# Patient Record
Sex: FEMALE | Race: WHITE | Employment: UNEMPLOYED | URBAN - METROPOLITAN AREA
[De-identification: names, ages, dates, MRNs, and addresses within clinical notes are randomized per-mention and may not be internally consistent; named-entity substitution may affect disease eponyms.]

---

## 2019-02-27 PROBLEM — M05.79 RHEUMATOID ARTHRITIS INVOLVING MULTIPLE SITES WITH POSITIVE RHEUMATOID FACTOR (HCC): Status: ACTIVE | Noted: 2019-02-27

## 2019-02-27 PROBLEM — J84.9 INTERSTITIAL LUNG DISEASE (HCC): Status: ACTIVE | Noted: 2019-02-27

## 2019-02-27 PROBLEM — O09.511 AMA (ADVANCED MATERNAL AGE) PRIMIGRAVIDA 35+, FIRST TRIMESTER: Status: ACTIVE | Noted: 2019-02-27

## 2019-03-07 PROBLEM — Z67.91 RH NEGATIVE STATE IN ANTEPARTUM PERIOD: Status: ACTIVE | Noted: 2019-03-07

## 2019-03-07 PROBLEM — O26.899 RH NEGATIVE STATE IN ANTEPARTUM PERIOD: Status: ACTIVE | Noted: 2019-03-07

## 2019-03-20 PROBLEM — J96.11 CHRONIC RESPIRATORY FAILURE WITH HYPOXIA (HCC): Status: ACTIVE | Noted: 2019-03-20

## 2019-05-06 PROBLEM — O09.512 PRIMIGRAVIDA OF ADVANCED MATERNAL AGE IN SECOND TRIMESTER: Status: ACTIVE | Noted: 2019-02-27

## 2019-05-09 PROBLEM — J96.11 CHRONIC RESPIRATORY FAILURE WITH HYPOXIA (HCC): Status: RESOLVED | Noted: 2019-03-20 | Resolved: 2019-05-09

## 2019-05-13 ENCOUNTER — HOSPITAL ENCOUNTER (OUTPATIENT)
Dept: NON INVASIVE DIAGNOSTICS | Age: 39
Discharge: HOME | End: 2019-05-13
Attending: INTERNAL MEDICINE
Payer: COMMERCIAL

## 2019-05-13 PROBLEM — O09.92 HRP (HIGH RISK PREGNANCY), SECOND TRIMESTER: Status: ACTIVE | Noted: 2019-05-13

## 2019-05-13 PROBLEM — O44.02 PLACENTA PREVIA ANTEPARTUM IN SECOND TRIMESTER: Status: ACTIVE | Noted: 2019-05-13

## 2019-05-13 PROCEDURE — 93306 TTE W/DOPPLER COMPLETE: CPT

## 2019-05-17 NOTE — PROGRESS NOTES
Spoke with the patient in regards to their Echo results, explained to the patient per Dr. Ciro West that the echo did not show any signs of pulmonary hypertension which is good news and that he would like for her to keep her follow up appointment with Dr. Apollo Artis. Patient understood and did not have any further questions or concerns at this time. // Georgia REYES

## 2019-05-27 ENCOUNTER — HOSPITAL ENCOUNTER (OUTPATIENT)
Age: 39
Discharge: HOME | End: 2019-05-27
Attending: OBSTETRICS & GYNECOLOGY | Admitting: OBSTETRICS & GYNECOLOGY
Payer: COMMERCIAL

## 2019-05-27 VITALS
RESPIRATION RATE: 16 BRPM | DIASTOLIC BLOOD PRESSURE: 72 MMHG | SYSTOLIC BLOOD PRESSURE: 124 MMHG | HEART RATE: 86 BPM | WEIGHT: 235 LBS | BODY MASS INDEX: 32.9 KG/M2 | TEMPERATURE: 98 F | HEIGHT: 71 IN

## 2019-05-27 PROBLEM — R10.9 ABDOMINAL PAIN DURING PREGNANCY IN SECOND TRIMESTER: Status: ACTIVE | Noted: 2019-05-27

## 2019-05-27 PROBLEM — O26.892 ABDOMINAL PAIN DURING PREGNANCY IN SECOND TRIMESTER: Status: ACTIVE | Noted: 2019-05-27

## 2019-05-27 LAB
GLUCOSE, GLUUPC: NEGATIVE
KETONES UR-MCNC: NEGATIVE MG/DL
PROT UR QL: NEGATIVE

## 2019-05-27 PROCEDURE — 81002 URINALYSIS NONAUTO W/O SCOPE: CPT | Performed by: OBSTETRICS & GYNECOLOGY

## 2019-05-27 PROCEDURE — 99282 EMERGENCY DEPT VISIT SF MDM: CPT

## 2019-05-27 RX ORDER — GUAIFENESIN 100 MG/5ML
162 LIQUID (ML) ORAL DAILY
COMMUNITY
End: 2019-09-16

## 2019-05-27 RX ORDER — ZINC GLUCONATE 10 MG
250 LOZENGE ORAL
COMMUNITY
End: 2019-10-14

## 2019-05-27 RX ORDER — ACETAMINOPHEN/DIPHENHYDRAMINE 500MG-25MG
TABLET ORAL
COMMUNITY
End: 2019-10-02

## 2019-05-27 NOTE — DISCHARGE INSTRUCTIONS
Patient Education   Patient Education   Patient Education        Low-Fat Diet for Gallbladder Disease: Care Instructions  Your Care Instructions    When you eat, the gallbladder releases bile, which helps you digest the fat in food. If you have an inflamed gallbladder, this may cause pain. A low-fat diet may give your gallbladder a rest so you can start to heal. Your doctor and dietitian can help you make an eating plan that does not irritate your digestive system. Always talk with your doctor or dietitian before you make changes in your diet. Follow-up care is a key part of your treatment and safety. Be sure to make and go to all appointments, and call your doctor if you are having problems. It's also a good idea to know your test results and keep a list of the medicines you take. How can you care for yourself at home? · Eat many small meals and snacks each day instead of three large meals. · Choose lean meats. ? Eat no more than 5 to 6½ ounces of meat a day. ? Cut off all fat you can see. ? Eat chicken and turkey without the skin. ? Many types of fish, such as salmon, lake trout, tuna, and herring, provide healthy omega-3 fat. But, avoid fish canned in oil, such as sardines in olive oil. ? Bake, broil, or grill meats, poultry, or fish instead of frying them in butter or fat. · Drink or eat nonfat or low-fat milk, yogurt, cheese, or other milk products each day. ? Read the labels on cheeses, and choose those with less than 5 grams of fat an ounce. ? Try fat-free sour cream, cream cheese, or yogurt. ? Avoid cream soups and cream sauces on pasta. ? Eat low-fat ice cream, frozen yogurt, or sorbet. Avoid regular ice cream.  · Eat whole-grain cereals, breads, crackers, rice, or pasta. Avoid high-fat foods such as croissants, scones, biscuits, waffles, doughnuts, muffins, granola, and high-fat breads.   · Flavor your foods with herbs and spices (such as basil, tarragon, or mint), fat-free sauces, or lemon juice instead of butter. You can also use butter substitutes, fat-free mayonnaise, or fat-free dressing. · Try applesauce, prune puree, or mashed bananas to replace some or all of the fat when you bake. · Limit fats and oils, such as butter, margarine, mayonnaise, and salad dressing, to no more than 1 tablespoon a meal.  · Avoid high-fat foods, such as:  ? Chocolate, whole milk, ice cream, and processed cheese. ? Fried or buttered foods. ? Sausage, salami, and hung. ? Cinnamon rolls, cakes, pies, cookies, and other pastries. ? Prepared snack foods, such as potato chips, nut and granola bars, and mixed nuts. ? Coconut and avocado. · Learn how to read food labels for serving sizes and ingredients. Fast-food and convenience-food meals often have lots of fat. Where can you learn more? Go to http://magaly-surendra.info/. Enter I833 in the search box to learn more about \"Low-Fat Diet for Gallbladder Disease: Care Instructions. \"  Current as of: 2018  Content Version: 11.9  © 6735-7602 [employer]. Care instructions adapted under license by [employer] (which disclaims liability or warranty for this information). If you have questions about a medical condition or this instruction, always ask your healthcare professional. [place] [address] any warranty or liability for your use of this information.  Labor: Care Instructions  Your Care Instructions     labor is the start of labor between 21 and 36 weeks of pregnancy. A full-term pregnancy lasts 37 to 42 weeks. In labor, the uterus contracts to open the cervix. This is the first stage of childbirth.  labor can be caused by a problem with the baby, the mother, or both. Often the cause is not known. In some cases, doctors use medicines to try to delay labor until 29 or more weeks of pregnancy. By this time, a baby has grown enough so that problems are not likely.  In some cases--such as with a serious infection--it is healthier for the baby to be born early. Your treatment will depend on how far along you are in your pregnancy and on your health and your baby's health. Follow-up care is a key part of your treatment and safety. Be sure to make and go to all appointments, and call your doctor if you are having problems. It's also a good idea to know your test results and keep a list of the medicines you take. How can you care for yourself at home? · If your doctor prescribed medicines, take them exactly as directed. Call your doctor if you think you are having a problem with your medicine. · Rest until your doctor advises you about activity. He or she will tell you if you should stay in bed most of the time. You may need to arrange for  if you have young children. · Do not have sexual intercourse unless your doctor says it is safe. · Use pads, not tampons, if you have vaginal bleeding. · Make sure to drink plenty of fluids. Dehydration can lead to contractions. If you have kidney, heart, or liver disease and have to limit fluids, talk with your doctor before you increase the amount of fluids you drink. · Do not smoke or allow others to smoke around you. If you need help quitting, talk to your doctor about stop-smoking programs and medicines. These can increase your chances of quitting for good. When should you call for help? Call 911 anytime you think you may need emergency care. For example, call if:    · You passed out (lost consciousness).     · You have severe vaginal bleeding.     · You have severe pain in your belly or pelvis.     · You have had fluid gushing or leaking from your vagina and you know or think the umbilical cord is bulging into your vagina. If this happens, immediately get down on your knees so your rear end (buttocks) is higher than your head.  This will decrease the pressure on the cord until help arrives.   [facility] your doctor now or seek immediate medical care if:    · You have signs of preeclampsia, such as:  ? Sudden swelling of your face, hands, or feet. ? New vision problems (such as dimness or blurring). ? A severe headache.     · You have any vaginal bleeding.     · You have belly pain or cramping.     · You have a fever.     · You have had regular contractions (with or without pain) for an hour. This means that you have 6 or more within 1 hour after you change your position and drink fluids.     · You have a sudden release of fluid from the vagina.     · You have low back pain or pelvic pressure that does not go away.     · You notice that your baby has stopped moving or is moving much less than normal.    Watch closely for changes in your health, and be sure to contact your doctor if you have any problems. Where can you learn more? Go to http://magaly-surendra.info/. Enter Q400 in the search box to learn more about \" Labor: Care Instructions. \"  Current as of: 2018  Content Version: 11.9  © 3399-3614 [employer]. Care instructions adapted under license by [employer] (which disclaims liability or warranty for this information). If you have questions about a medical condition or this instruction, always ask your healthcare professional. [address] any warranty or liability for your use of this information. Pregnancy Precautions: Care Instructions  Your Care Instructions    There is no sure way to prevent labor before your due date ( labor) or to prevent most other pregnancy problems. But there are things you can do to increase your chances of a healthy pregnancy. Go to your appointments, follow your doctor's advice, and take good care of yourself. Eat well, and exercise (if your doctor agrees). And make sure to drink plenty of water. Follow-up care is a key part of your treatment and safety.  Be sure to make and go to all appointments, and call your doctor if you are having problems. It's also a good idea to know your test results and keep a list of the medicines you take. How can you care for yourself at home? · Make sure you go to your prenatal appointments. At each visit, your doctor will check your blood pressure. Your doctor will also check to see if you have protein in your urine. High blood pressure and protein in urine are signs of preeclampsia. This condition can be dangerous for you and your baby. · Drink plenty of fluids, enough so that your urine is light yellow or clear like water. Dehydration can cause contractions. If you have kidney, heart, or liver disease and have to limit fluids, talk with your doctor before you increase the amount of fluids you drink. · Tell your doctor right away if you notice any symptoms of an infection, such as:  ? Burning when you urinate. ? A foul-smelling discharge from your vagina. ? Vaginal itching. ? Unexplained fever. ? Unusual pain or soreness in your uterus or lower belly. · Eat a balanced diet. Include plenty of foods that are high in calcium and iron. ? Foods high in calcium include milk, cheese, yogurt, almonds, and broccoli. ? Foods high in iron include red meat, shellfish, poultry, eggs, beans, raisins, whole-grain bread, and leafy green vegetables. · Do not smoke. If you need help quitting, talk to your doctor about stop-smoking programs and medicines. These can increase your chances of quitting for good. · Do not drink alcohol or use illegal drugs. · Follow your doctor's directions about activity. Your doctor will let you know how much, if any, exercise you can do. · Ask your doctor if you can have sex. If you are at risk for early labor, your doctor may ask you to not have sex. · Take care to prevent falls. During pregnancy, your joints are loose, and your balance is off. Sports such as bicycling, skiing, or in-line skating can increase your risk of falling.  And don't ride horses or motorcycles, dive, water ski, scuba dive, or parachute jump while you are pregnant. · Avoid getting very hot. Do not use saunas or hot tubs. Avoid staying out in the sun in hot weather for long periods. Take acetaminophen (Tylenol) to lower a high fever. · Do not take any over-the-counter or herbal medicines or supplements without talking to your doctor or pharmacist first.  When should you call for help? Call 911 anytime you think you may need emergency care. For example, call if:    · You passed out (lost consciousness).     · You have severe vaginal bleeding.     · You have severe pain in your belly or pelvis.     · You have had fluid gushing or leaking from your vagina and you know or think the umbilical cord is bulging into your vagina. If this happens, immediately get down on your knees so your rear end (buttocks) is higher than your head. This will decrease the pressure on the cord until help arrives.   [facility] your doctor now or seek immediate medical care if:    · You have signs of preeclampsia, such as:  ? Sudden swelling of your face, hands, or feet. ? New vision problems (such as dimness or blurring). ? A severe headache.     · You have any vaginal bleeding.     · You have belly pain or cramping.     · You have a fever.     · You have had regular contractions (with or without pain) for an hour. This means that you have 8 or more within 1 hour or 4 or more in 20 minutes after you change your position and drink fluids.     · You have a sudden release of fluid from your vagina.     · You have low back pain or pelvic pressure that does not go away.     · You notice that your baby has stopped moving or is moving much less than normal.    Watch closely for changes in your health, and be sure to contact your doctor if you have any problems. Where can you learn more? Go to http://magaly-surendra.info/.   Enter [id number] in the search box to learn more about \"Pregnancy Precautions: Care Instructions. \"  Current as of: September 5, 2018  Content Version: 11.9  © 0894-7242 [employer], [facility]. Care instructions adapted under license by [employer] (which disclaims liability or warranty for this information). If you have questions about a medical condition or this instruction, always ask your healthcare professional. [place] [address] any warranty or liability for your use of this information.

## 2019-05-27 NOTE — ED PROVIDER NOTES
Chief Complaint:      41 y.o. female at 21w1d  weeks gestation who is seen for moderate abdominal pain. Pain present off and on x 2 wks, much worse after eating [employer] last night, greasy food. Denies nausea or vomiting. Denies fevers chills, vaginal bleeding, cramping or LOF. Good fetal movement. Pregnancy complicated by RA w interstitial lung involvement. HISTORY:    Social History     Substance and Sexual Activity   Sexual Activity Yes    Birth control/protection: None     Patient's last menstrual period was 12/30/2018 (approximate).     Social History     Socioeconomic History    Marital status:      Spouse name: Not on file    Number of children: Not on file    Years of education: Not on file    Highest education level: Not on file   Occupational History    Not on file   Social Needs    Financial resource strain: Not on file    Food insecurity:     Worry: Not on file     Inability: Not on file    Transportation needs:     Medical: Not on file     Non-medical: Not on file   Tobacco Use    Smoking status: Never Smoker    Smokeless tobacco: Never Used   Substance and Sexual Activity    Alcohol use: No     Frequency: Never    Drug use: No    Sexual activity: Yes     Birth control/protection: None   Lifestyle    Physical activity:     Days per week: Not on file     Minutes per session: Not on file    Stress: Not on file   Relationships    Social connections:     Talks on phone: Not on file     Gets together: Not on file     Attends Voodoo service: Not on file     Active member of club or organization: Not on file     Attends meetings of clubs or organizations: Not on file     Relationship status: Not on file    Intimate partner violence:     Fear of current or ex partner: Not on file     Emotionally abused: Not on file     Physically abused: Not on file     Forced sexual activity: Not on file   Other Topics Concern   [employer] [address] Service Not Asked    Blood Transfusions Not Asked    Caffeine Concern No    Occupational Exposure Not Asked    Hobby Hazards Not Asked    Sleep Concern Not Asked    Stress Concern Not Asked    Weight Concern Not Asked    Special Diet Not Asked    Back Care Not Asked    Exercise No    Bike Helmet Not Asked    Seat Belt Yes    Self-Exams Yes   Social History Narrative    Abuse: Feels safe at home, no history of physical abuse, no history of sexual abuse       Past Surgical History:   Procedure Laterality Date    HX TONSILLECTOMY         Past Medical History:   Diagnosis Date    Arthritis, rheumatoid ([address]     Bowel obstruction ([address]     no surgery done    Chronic respiratory failure with hypoxia ([address] 3/20/2019    Disease of blood and blood forming organ     Insomnia     Lung interstitial disease ([address]     Pericardial cyst     Rhesus isoimmunization affecting pregnancy     Rh negative    Spondylosis          ROS:  A 12 point review of symptoms negative except for chief complaint as described above. PHYSICAL EXAM:  Blood pressure 124/72, pulse 86, temperature 98 °F (36.7 °C), resp. rate 16, height 5' 11\" (1.803 m), weight 106.6 kg (235 lb), last menstrual period 12/30/2018. Constitutional: The patient appears well, alert, oriented x 3. Cardiovascular: Heart RRR, no murmurs. Respiratory: Lungs clear, no respiratory distress  GI: Abdomen soft, moderately tender RUQ more laterally. Reproduces pt's complaint. No fundal tenderness  Musculoskeletal: no cva tenderness  Upper ext: no edema, reflexes +2  Lower ext: no edema, neg moriah's, reflexes +2  Skin: no rashes or lesions  Psychiatric:Mood/ Affect: appropriate  Genitourinary: SVE: deferred, no contractions  FHT: 145-150  TOCO: no contractions    UA negative    I personally reviewed pt's medical record including relevant labs and ultrasounds    Assessment/Plan: RUQ pain, suspect cholecystitis. Offered eval in main ED w ultrasound, declines at this time.   Discussed gall bladder diet.   Questions answered.

## 2019-05-27 NOTE — PROGRESS NOTES
Given discharge instructions-  labor precautions, gallbladder information, pregnancy precautions. Pt verbalized understanding. Has an appt on 6/3/19. Will call MD's office in the morning. Left unit ambulating.

## 2019-05-27 NOTE — PROGRESS NOTES
Dr Maxine Womack at . Assessment completed. Offered to be sent downstairs for a gallbladder ultrasound. Pt initially said she would go downstairs but then said she will  Go home and call the office tomorrow. Dr Maxine Womack encouraged a low fat bland diet. Pt verbalized understanding.

## 2019-05-27 NOTE — PROGRESS NOTES
States she has been having RUQ pain x2 weeks, worsening yesterday. Constant discomfort- dull stabbing. Pt states she did eat zaxby's yesterday. +'s and toco applied.

## 2019-06-03 PROBLEM — R10.9 ABDOMINAL PAIN DURING PREGNANCY IN SECOND TRIMESTER: Status: RESOLVED | Noted: 2019-05-27 | Resolved: 2019-06-03

## 2019-06-03 PROBLEM — O26.892 ABDOMINAL PAIN DURING PREGNANCY IN SECOND TRIMESTER: Status: RESOLVED | Noted: 2019-05-27 | Resolved: 2019-06-03

## 2019-06-06 ENCOUNTER — TELEPHONE (OUTPATIENT)
Dept: LABOR AND DELIVERY | Age: 39
End: 2019-06-06

## 2019-06-25 ENCOUNTER — HOSPITAL ENCOUNTER (OUTPATIENT)
Dept: SURGERY | Age: 39
Discharge: HOME | End: 2019-06-25
Attending: OBSTETRICS & GYNECOLOGY

## 2019-06-25 NOTE — PERIOP NOTES
Pt here to see anesthesia prior to delivery d/t medical history. Received orders from  (anesthesiologist) to obtain medical records from [facility] re: pt's dx of spondylosis and treatment. Pt reports received treatment at [facility] in [place], [place] between the years for 2005- present which included pulmonology and rheumatology departments. Anesthesia requesting x-rays, MRI, PFTs, bronchoscopy reports and most recent MD office notes. [facility] in [facility]. Obtained fax # for HMI/ medical records department. Faxed request along with signed Authorization to disclose health information form to [facility]. Requested information be faxed to [facility]- 4th floor L&D Navigator, Karla Bess.     Also emailed Navigator with above information re: anesthesia request.

## 2019-06-27 PROBLEM — O44.42 LOW-LYING PLACENTA IN SECOND TRIMESTER: Status: ACTIVE | Noted: 2019-05-13

## 2019-06-27 PROBLEM — M06.9 MATERNAL RHEUMATOID ARTHRITIS COMPLICATING PREGNANCY (HCC): Status: ACTIVE | Noted: 2019-02-27

## 2019-06-27 PROBLEM — O99.891 MATERNAL RHEUMATOID ARTHRITIS COMPLICATING PREGNANCY (HCC): Status: ACTIVE | Noted: 2019-02-27

## 2019-07-02 PROBLEM — R05.9 COUGH: Status: ACTIVE | Noted: 2019-07-02

## 2019-07-16 PROBLEM — O09.93 HIGH-RISK PREGNANCY IN THIRD TRIMESTER: Status: ACTIVE | Noted: 2019-05-13

## 2019-07-16 PROBLEM — O09.513 PRIMIGRAVIDA OF ADVANCED MATERNAL AGE IN THIRD TRIMESTER: Status: ACTIVE | Noted: 2019-02-27

## 2019-07-16 PROBLEM — R05.9 COUGH: Status: RESOLVED | Noted: 2019-07-02 | Resolved: 2019-07-16

## 2019-07-16 PROBLEM — O44.43 LOW-LYING PLACENTA IN THIRD TRIMESTER: Status: ACTIVE | Noted: 2019-05-13

## 2019-07-17 PROBLEM — O99.013 ANEMIA DURING PREGNANCY IN THIRD TRIMESTER: Status: ACTIVE | Noted: 2019-07-17

## 2019-07-25 PROBLEM — O36.63X0 MATERNAL CARE FOR EXCESSIVE FETAL GROWTH IN THIRD TRIMESTER: Status: ACTIVE | Noted: 2019-07-25

## 2019-09-29 ENCOUNTER — ANESTHESIA EVENT (OUTPATIENT)
Dept: LABOR AND DELIVERY | Age: 39
End: 2019-09-29
Payer: COMMERCIAL

## 2019-09-30 ENCOUNTER — HOSPITAL ENCOUNTER (INPATIENT)
Age: 39
LOS: 2 days | Discharge: HOME | End: 2019-10-02
Attending: OBSTETRICS & GYNECOLOGY | Admitting: OBSTETRICS & GYNECOLOGY
Payer: COMMERCIAL

## 2019-09-30 ENCOUNTER — ANESTHESIA (OUTPATIENT)
Dept: LABOR AND DELIVERY | Age: 39
End: 2019-09-30
Payer: COMMERCIAL

## 2019-09-30 PROBLEM — Z98.891 HISTORY OF PRIMARY CESAREAN SECTION: Status: ACTIVE | Noted: 2019-09-30

## 2019-09-30 LAB
ABO + RH BLD: NORMAL
BLOOD BANK CMNT PATIENT-IMP: NORMAL
BLOOD GROUP ANTIBODIES SERPL: NORMAL
ERYTHROCYTE [DISTWIDTH] IN BLOOD BY AUTOMATED COUNT: 14.5 % (ref 11.9–14.6)
FETAL SCREEN,FMHS: NORMAL
HCT VFR BLD AUTO: 37.4 % (ref 35.8–46.3)
HGB BLD-MCNC: 12.7 G/DL (ref 11.7–15.4)
MCH RBC QN AUTO: 29.7 PG (ref 26.1–32.9)
MCHC RBC AUTO-ENTMCNC: 34 G/DL (ref 31.4–35)
MCV RBC AUTO: 87.4 FL (ref 79.6–97.8)
NRBC # BLD: 0 K/UL (ref 0–0.2)
PLATELET # BLD AUTO: 198 K/UL (ref 150–450)
PMV BLD AUTO: 10.8 FL (ref 9.4–12.3)
RBC # BLD AUTO: 4.28 M/UL (ref 4.05–5.2)
SPECIMEN EXP DATE BLD: NORMAL
WBC # BLD AUTO: 10.7 K/UL (ref 4.3–11.1)

## 2019-09-30 PROCEDURE — 77030034696 HC CATH URETH FOL 2W BARD -A

## 2019-09-30 PROCEDURE — 85461 HEMOGLOBIN FETAL: CPT

## 2019-09-30 PROCEDURE — 77030032490 HC SLV COMPR SCD KNE COVD -B: Performed by: OBSTETRICS & GYNECOLOGY

## 2019-09-30 PROCEDURE — 77030003665 HC NDL SPN BBMI -A: Performed by: ANESTHESIOLOGY

## 2019-09-30 PROCEDURE — 77030002966 HC SUT PDS J&J -A: Performed by: OBSTETRICS & GYNECOLOGY

## 2019-09-30 PROCEDURE — 65270000029 HC RM PRIVATE

## 2019-09-30 PROCEDURE — 85027 COMPLETE CBC AUTOMATED: CPT

## 2019-09-30 PROCEDURE — 76010000391 HC C SECN FIRST 1 HR: Performed by: OBSTETRICS & GYNECOLOGY

## 2019-09-30 PROCEDURE — 4A1HXCZ MONITORING OF PRODUCTS OF CONCEPTION, CARDIAC RATE, EXTERNAL APPROACH: ICD-10-PCS | Performed by: OBSTETRICS & GYNECOLOGY

## 2019-09-30 PROCEDURE — 86900 BLOOD TYPING SEROLOGIC ABO: CPT

## 2019-09-30 PROCEDURE — 77030007880 HC KT SPN EPDRL BBMI -B: Performed by: ANESTHESIOLOGY

## 2019-09-30 PROCEDURE — 77030018846 HC SOL IRR STRL H20 ICUM -A: Performed by: OBSTETRICS & GYNECOLOGY

## 2019-09-30 PROCEDURE — 74011250636 HC RX REV CODE- 250/636: Performed by: ANESTHESIOLOGY

## 2019-09-30 PROCEDURE — 75410000003 HC RECOV DEL/VAG/CSECN EA 0.5 HR: Performed by: OBSTETRICS & GYNECOLOGY

## 2019-09-30 PROCEDURE — 77030002933 HC SUT MCRYL J&J -A: Performed by: OBSTETRICS & GYNECOLOGY

## 2019-09-30 PROCEDURE — 77030020255 HC SOL INJ LR 1000ML BG

## 2019-09-30 PROCEDURE — 74011250636 HC RX REV CODE- 250/636

## 2019-09-30 PROCEDURE — 76060000078 HC EPIDURAL ANESTHESIA: Performed by: OBSTETRICS & GYNECOLOGY

## 2019-09-30 PROCEDURE — 74011250636 HC RX REV CODE- 250/636: Performed by: OBSTETRICS & GYNECOLOGY

## 2019-09-30 PROCEDURE — 74011000250 HC RX REV CODE- 250

## 2019-09-30 PROCEDURE — 77030018836 HC SOL IRR NACL ICUM -A: Performed by: OBSTETRICS & GYNECOLOGY

## 2019-09-30 PROCEDURE — 77030040361 HC SLV COMPR DVT MDII -B

## 2019-09-30 PROCEDURE — 74011250637 HC RX REV CODE- 250/637: Performed by: ANESTHESIOLOGY

## 2019-09-30 PROCEDURE — 59025 FETAL NON-STRESS TEST: CPT

## 2019-09-30 PROCEDURE — 59510 CESAREAN DELIVERY: CPT | Performed by: OBSTETRICS & GYNECOLOGY

## 2019-09-30 PROCEDURE — 77030031139 HC SUT VCRL2 J&J -A: Performed by: OBSTETRICS & GYNECOLOGY

## 2019-09-30 PROCEDURE — 77030011943: Performed by: OBSTETRICS & GYNECOLOGY

## 2019-09-30 RX ORDER — SODIUM CHLORIDE, SODIUM LACTATE, POTASSIUM CHLORIDE, CALCIUM CHLORIDE 600; 310; 30; 20 MG/100ML; MG/100ML; MG/100ML; MG/100ML
125 INJECTION, SOLUTION INTRAVENOUS CONTINUOUS
Status: DISCONTINUED | OUTPATIENT
Start: 2019-09-30 | End: 2019-10-01

## 2019-09-30 RX ORDER — MORPHINE SULFATE 0.5 MG/ML
INJECTION, SOLUTION EPIDURAL; INTRATHECAL; INTRAVENOUS
Status: COMPLETED | OUTPATIENT
Start: 2019-09-30 | End: 2019-09-30

## 2019-09-30 RX ORDER — TRISODIUM CITRATE DIHYDRATE AND CITRIC ACID MONOHYDRATE 500; 334 MG/5ML; MG/5ML
30 SOLUTION ORAL ONCE
Status: COMPLETED | OUTPATIENT
Start: 2019-09-30 | End: 2019-09-30

## 2019-09-30 RX ORDER — METOCLOPRAMIDE 10 MG/1
10 TABLET ORAL ONCE
Status: COMPLETED | OUTPATIENT
Start: 2019-09-30 | End: 2019-09-30

## 2019-09-30 RX ORDER — SODIUM CHLORIDE, SODIUM LACTATE, POTASSIUM CHLORIDE, CALCIUM CHLORIDE 600; 310; 30; 20 MG/100ML; MG/100ML; MG/100ML; MG/100ML
INJECTION, SOLUTION INTRAVENOUS
Status: DISCONTINUED | OUTPATIENT
Start: 2019-09-30 | End: 2019-09-30 | Stop reason: HOSPADM

## 2019-09-30 RX ORDER — ONDANSETRON 2 MG/ML
INJECTION INTRAMUSCULAR; INTRAVENOUS AS NEEDED
Status: DISCONTINUED | OUTPATIENT
Start: 2019-09-30 | End: 2019-09-30 | Stop reason: HOSPADM

## 2019-09-30 RX ORDER — BUPIVACAINE HYDROCHLORIDE 7.5 MG/ML
INJECTION, SOLUTION INTRASPINAL
Status: COMPLETED | OUTPATIENT
Start: 2019-09-30 | End: 2019-09-30

## 2019-09-30 RX ORDER — KETOROLAC TROMETHAMINE 30 MG/ML
INJECTION, SOLUTION INTRAMUSCULAR; INTRAVENOUS AS NEEDED
Status: DISCONTINUED | OUTPATIENT
Start: 2019-09-30 | End: 2019-09-30 | Stop reason: HOSPADM

## 2019-09-30 RX ORDER — HYDROMORPHONE HYDROCHLORIDE 1 MG/ML
1 INJECTION, SOLUTION INTRAMUSCULAR; INTRAVENOUS; SUBCUTANEOUS
Status: DISCONTINUED | OUTPATIENT
Start: 2019-09-30 | End: 2019-10-01

## 2019-09-30 RX ORDER — FAMOTIDINE 20 MG/1
20 TABLET, FILM COATED ORAL ONCE
Status: COMPLETED | OUTPATIENT
Start: 2019-09-30 | End: 2019-09-30

## 2019-09-30 RX ORDER — KETOROLAC TROMETHAMINE 30 MG/ML
30 INJECTION, SOLUTION INTRAMUSCULAR; INTRAVENOUS
Status: DISCONTINUED | OUTPATIENT
Start: 2019-09-30 | End: 2019-10-01

## 2019-09-30 RX ORDER — OXYTOCIN/RINGER'S LACTATE 30/500 ML
PLASTIC BAG, INJECTION (ML) INTRAVENOUS
Status: DISCONTINUED | OUTPATIENT
Start: 2019-09-30 | End: 2019-09-30 | Stop reason: HOSPADM

## 2019-09-30 RX ORDER — CEFAZOLIN SODIUM/WATER 2 G/20 ML
2 SYRINGE (ML) INTRAVENOUS ONCE
Status: COMPLETED | OUTPATIENT
Start: 2019-09-30 | End: 2019-09-30

## 2019-09-30 RX ORDER — NALBUPHINE HYDROCHLORIDE 10 MG/ML
5 INJECTION, SOLUTION INTRAMUSCULAR; INTRAVENOUS; SUBCUTANEOUS
Status: DISCONTINUED | OUTPATIENT
Start: 2019-09-30 | End: 2019-10-01

## 2019-09-30 RX ORDER — SODIUM CHLORIDE 0.9 % (FLUSH) 0.9 %
5-40 SYRINGE (ML) INJECTION EVERY 8 HOURS
Status: DISCONTINUED | OUTPATIENT
Start: 2019-09-30 | End: 2019-09-30 | Stop reason: HOSPADM

## 2019-09-30 RX ORDER — SODIUM CHLORIDE 0.9 % (FLUSH) 0.9 %
5-40 SYRINGE (ML) INJECTION AS NEEDED
Status: DISCONTINUED | OUTPATIENT
Start: 2019-09-30 | End: 2019-09-30 | Stop reason: HOSPADM

## 2019-09-30 RX ORDER — DEXAMETHASONE SODIUM PHOSPHATE 100 MG/10ML
INJECTION INTRAMUSCULAR; INTRAVENOUS AS NEEDED
Status: DISCONTINUED | OUTPATIENT
Start: 2019-09-30 | End: 2019-09-30 | Stop reason: HOSPADM

## 2019-09-30 RX ORDER — EPHEDRINE SULFATE 50 MG/ML
INJECTION, SOLUTION INTRAVENOUS AS NEEDED
Status: DISCONTINUED | OUTPATIENT
Start: 2019-09-30 | End: 2019-09-30 | Stop reason: HOSPADM

## 2019-09-30 RX ORDER — OXYCODONE HYDROCHLORIDE 5 MG/1
5 TABLET ORAL
Status: DISCONTINUED | OUTPATIENT
Start: 2019-09-30 | End: 2019-10-01

## 2019-09-30 RX ORDER — ACETAMINOPHEN 500 MG
1000 TABLET ORAL EVERY 8 HOURS
Status: COMPLETED | OUTPATIENT
Start: 2019-09-30 | End: 2019-10-01

## 2019-09-30 RX ADMIN — OXYCODONE HYDROCHLORIDE 5 MG: 5 TABLET ORAL at 20:35

## 2019-09-30 RX ADMIN — BUPIVACAINE HYDROCHLORIDE 12 MG: 7.5 INJECTION, SOLUTION INTRASPINAL at 09:15

## 2019-09-30 RX ADMIN — KETOROLAC TROMETHAMINE 30 MG: 30 INJECTION, SOLUTION INTRAMUSCULAR at 16:01

## 2019-09-30 RX ADMIN — KETOROLAC TROMETHAMINE 30 MG: 30 INJECTION, SOLUTION INTRAMUSCULAR at 22:29

## 2019-09-30 RX ADMIN — FAMOTIDINE 20 MG: 20 TABLET, FILM COATED ORAL at 08:01

## 2019-09-30 RX ADMIN — ONDANSETRON 4 MG: 2 INJECTION INTRAMUSCULAR; INTRAVENOUS at 09:32

## 2019-09-30 RX ADMIN — EPHEDRINE SULFATE 10 MG: 50 INJECTION, SOLUTION INTRAVENOUS at 09:43

## 2019-09-30 RX ADMIN — SODIUM CITRATE AND CITRIC ACID MONOHYDRATE 30 ML: 500; 334 SOLUTION ORAL at 08:01

## 2019-09-30 RX ADMIN — METOCLOPRAMIDE 10 MG: 10 TABLET ORAL at 08:01

## 2019-09-30 RX ADMIN — SODIUM CHLORIDE, SODIUM LACTATE, POTASSIUM CHLORIDE, AND CALCIUM CHLORIDE 125 ML/HR: 600; 310; 30; 20 INJECTION, SOLUTION INTRAVENOUS at 11:45

## 2019-09-30 RX ADMIN — KETOROLAC TROMETHAMINE 30 MG: 30 INJECTION, SOLUTION INTRAMUSCULAR; INTRAVENOUS at 09:57

## 2019-09-30 RX ADMIN — MORPHINE SULFATE 0.15 MG: 0.5 INJECTION, SOLUTION EPIDURAL; INTRATHECAL; INTRAVENOUS at 09:15

## 2019-09-30 RX ADMIN — SODIUM CHLORIDE, SODIUM LACTATE, POTASSIUM CHLORIDE, AND CALCIUM CHLORIDE 1000 ML: 600; 310; 30; 20 INJECTION, SOLUTION INTRAVENOUS at 07:27

## 2019-09-30 RX ADMIN — DEXAMETHASONE SODIUM PHOSPHATE 5 MG: 100 INJECTION INTRAMUSCULAR; INTRAVENOUS at 09:57

## 2019-09-30 RX ADMIN — ACETAMINOPHEN 1000 MG: 500 TABLET, FILM COATED ORAL at 19:49

## 2019-09-30 RX ADMIN — SODIUM CHLORIDE, SODIUM LACTATE, POTASSIUM CHLORIDE, CALCIUM CHLORIDE: 600; 310; 30; 20 INJECTION, SOLUTION INTRAVENOUS at 09:08

## 2019-09-30 RX ADMIN — ACETAMINOPHEN 1000 MG: 500 TABLET, FILM COATED ORAL at 11:57

## 2019-09-30 RX ADMIN — EPHEDRINE SULFATE 10 MG: 50 INJECTION, SOLUTION INTRAVENOUS at 09:23

## 2019-09-30 RX ADMIN — Medication 2 G: at 08:30

## 2019-09-30 RX ADMIN — Medication 300 ML/HR: at 09:32

## 2019-09-30 NOTE — ANESTHESIA PROCEDURE NOTES
Spinal Block    Performed by: Pito Sheets MD  Authorized by: Pito Sheets MD     Pre-procedure:   Indications: primary anesthetic  Preanesthetic Checklist: patient identified, risks and benefits discussed, anesthesia consent, patient being monitored and timeout performed    Timeout Time: 09:11          Spinal Block:   Patient Position:  Seated  Prep Region:  Lumbar  Prep: chlorhexidine and patient draped      Location:  L3-4  Technique:  Single shot    Local Dose (mL):  3    Needle:   Needle Type:  Pencan  Needle Gauge:  25 G  Attempts:  1      Events: CSF confirmed, no blood with aspiration and no paresthesia        Assessment:  Insertion:  Uncomplicated  Patient tolerance:  Patient tolerated the procedure well with no immediate complications

## 2019-09-30 NOTE — PROGRESS NOTES
Admission assessment complete as noted. Patient oriented to room and unit. Plan of care reviewed and patient verbalizes understanding. Questions encouraged and answered. Patent encouraged to call for needs or concerns. Safety Teaching reviewed:   1. Hand hygiene prior to handling the infant. 2. Bracelets with matching numbers are placed on mother and infant  3. An infant security tag  [facility]) is placed on the infant's ankle and monitored  4. All OB nurses wear pink Employee badges - do not give your baby to anyone without proper identification. 5. Never leave the baby alone in the room. 6. The infant should be placed on their back to sleep. on a firm mattress. No toys should be placed in the crib. (safe sleep video offered to view)  7. Never shake the baby (video offered to view)  8. Infant fall prevention - do not sleep with the baby, and place the baby in the crib while ambulating. 5. Mother and Baby Care booklet given to Mother.

## 2019-09-30 NOTE — OP NOTES
Harriet Webber Lakeisha Lundberg  1980    Preop Dx:   1. IUP @ 39 1/7 weeks gestation   2. Interstitial lung disease    Postop Dx: Same    Procedure: Primary LTCS    Surgeon: Franci Diez      Anesthesia: spinl    EBL: 600 mL  IVF: 800 mL  UOP: 586 mL    Complications: None    Findings:  Viable Male infant. Vtx position. APGARS 9,9.  Weight:  9 lbs, 14 oz. Normal appearing uterus, tubes and ovaries. Procedure:  Pt was taken to the operating room where spinal anesthesia was found to be adequate. She was then prepped and draped in the usual sterile fashion and placed in the supine position with a leftward tilt. A Pfannenstiel skin incision was made with the scalpel and carried down to the underlying layer of fascia with the bovie. The fascia was incised in the midline and the incision extended laterally with the garza scissors. Superior of the fascial incision was grasped with Kocher clamps and  from the rectus muscles sharply and bluntly. In a similar fashion, the inferior aspect of the fascial incision was grasped with the Kocher clamps and  from the rectus muscles sharply and bluntly. The rectus muscles were  in the midline bluntly and peritoneum entered bluntly. The peritoneal incision was extended manually. Bladder blade was inserted and lower uterine incision made with the scalpel. Incision extended manually laterally. Infants head delivered atraumatically. Nose and mouth suctioned. Cord clamped and cut. Infant handed off to the waiting NICU staff. The uterus was exteriorized and cleared of all clots and debris. Hysterotomy was closed with 0-vicryl in a running, locking fashion. A second layer of 0-vicryl was placed in a interrupted figure of 8 fashion to imbricate the incision. The pelvis and gutters were cleared of all clots and debris, the uterus returned to the abdomen and hemostasis was assured throughout. Intercede was placed over the hysterotomy site.   Fascial incision repaired with 0-PDS in a running fashion. Subcutaneous tissue was cleared of all clots and debris and hemostasis assured. Subcutaneous tissue reapproximated with 3-0 vicryl rapide in a running fashion. Skin closed with 4-0 monocryl in a subcuticular fashion. Pt tolerated procedure well. Sponge, lap and needle counts correct x 3. Disposition: Pt to RR stable and infant to  nursery stable.     Pathology: none      Linda Persaud MD  9:59 AM  19

## 2019-09-30 NOTE — ANESTHESIA POSTPROCEDURE EVALUATION
Procedure(s):   SECTION. spinal    Anesthesia Post Evaluation        Patient location during evaluation: PACU  Patient participation: complete - patient participated  Level of consciousness: awake  Pain management: satisfactory to patient  Airway patency: patent  Anesthetic complications: no  Cardiovascular status: hemodynamically stable  Respiratory status: spontaneous ventilation  Hydration status: euvolemic  Post anesthesia nausea and vomiting:  none    O2 sat 97% on RA. Respirations unlabored. No vitals data found for the desired time range.

## 2019-09-30 NOTE — PROGRESS NOTES
Bedside Report of care using Wow received from [facility], RN. Jean-Paul graham.

## 2019-09-30 NOTE — LACTATION NOTE

## 2019-09-30 NOTE — LACTATION NOTE
In to see mom and infant for the first time. infant was latched on the left breast and sucking rhythmically in the cross cradle hold. Infant nursed for several more minutes and came off the breast content. I took infant and burped him and then placed him on mom's right breast in the cross cradle hold. Infant latched and nursed for 10 more minutes and fell asleep. Reviewed the expectations of the first 24 hours. Lactation consultant will follow up tomorrow.

## 2019-09-30 NOTE — PROGRESS NOTES
Patient in MIU room 447, report received from [facility]  Infant skin to skin   O2 sat 96-98% on room air.   Patient state she feels good with her breathing

## 2019-09-30 NOTE — ANESTHESIA PREPROCEDURE EVALUATION
Relevant Problems   No relevant active problems       Anesthetic History               Review of Systems / Medical History  Patient summary reviewed, nursing notes reviewed and pertinent labs reviewed    Pulmonary          Shortness of breath      Comments: ILD requiring 2L NC O2 at night and with exertion   Neuro/Psych             Comments: scoliosis Cardiovascular                  Exercise tolerance: <4 METS  Comments: Recent normal echo. GI/Hepatic/Renal                Endo/Other        Obesity and arthritis (RA)     Other Findings            Physical Exam    Airway  Mallampati: I  TM Distance: 4 - 6 cm  Neck ROM: normal range of motion   Mouth opening: Normal     Cardiovascular  Regular rate and rhythm,  S1 and S2 normal,  no murmur, click, rub, or gallop             Dental  No notable dental hx       Pulmonary  Breath sounds clear to auscultation               Abdominal         Other Findings            Anesthetic Plan    ASA: 3  Anesthesia type: spinal            Anesthetic plan and risks discussed with: Patient and Spouse      Risk of GA and post op vent addressed.

## 2019-09-30 NOTE — PROGRESS NOTES
SBAR OUT Report: Mother    Verbal report given to LONI Garrett RN on this patient, who is now being transferred to MIU for routine progression of care. The patient is wearing a green \"Anesthesia-Duramorph\" band. Report consisted of patient's Situation, Background, Assessment and Recommendations (SBAR).  ID bands were compared with the identification form, and verified with the patient and receiving nurse. Information from the SBAR, Intake/Output, MAR and Recent Results and the [place] Report was reviewed with the receiving nurse; opportunity for questions and clarification provided.

## 2019-09-30 NOTE — L&D DELIVERY NOTE
Delivery Summary    Patient: Max Owusu MRN: [id number]  SSN: [id number]    YOB: 1980  Age: 46 y.o. Sex: female        Information for the patient's :  Nic Santoyo [[id number]]       Labor Events:    Labor: No    Steroids: None   Cervical Ripening Date/Time:       Cervical Ripening Type: None   Antibiotics During Labor: No   Rupture Identifier:      Rupture Date/Time: 2019 9:29 AM   Rupture Type: AROM   Amniotic Fluid Volume: Moderate    Amniotic Fluid Description: Clear    Amniotic Fluid Odor: None    Induction: None       Induction Date/Time:        Indications for Induction:      Augmentation: None   Augmentation Date/Time:      Indications for Augmentation:     Labor complications: None       Additional complications:        Delivery Events:  Indications For Episiotomy:     Episiotomy: None   Perineal Laceration(s): None   Repaired:     Periurethral Laceration Location:      Repaired:     Labial Laceration Location:     Repaired:     Sulcal Laceration Location:     Repaired:     Vaginal Laceration Location:     Repaired:     Cervical Laceration Location:     Repaired:     Repair Suture: None   Number of Repair Packets:     Estimated Blood Loss (ml):  ml     Delivery Date: 2019    Delivery Time: 9:30 AM   Delivery Type: , Low Transverse     Details    Trial of Labor: No   Primary/Repeat: Primary   Priority: Routine   Indications:          Sex:  Male     Gestational Age: 36w3d  Delivery Clinician:  Charles Long  Living Status: Living   Delivery Location: OR            APGARS  One minute Five minutes Ten minutes   Skin color: 1   1        Heart rate: 2   2        Grimace: 2   2        Muscle tone: 2   2        Breathin   2        Totals: 9   9          Presentation: Vertex    Position:        Resuscitation Method:  Suctioning-bulb; Tactile Stimulation     Meconium Stained: None      Cord Information: 3 Vessels  Complications: Nuchal Cord Without Compressions  Cord around: head  Delayed cord clamping? No  Cord clamped date/time:   Disposition of Cord Blood: Lab    Blood Gases Sent?: No    Placenta:  Date/Time: 2019  9:31 AM  Removal: Expressed      Appearance: Normal;Intact     [place] Measurements:  Birth Weight: 9 lb 13.9 oz (4.475 kg)      Birth Length: 1' 10.05\" (0.56 m)      Head Circumference: 1' 2.96\" (0.38 m)      Chest Circumference: 1' 1.58\" (0.345 m)     Abdominal Girth: Other Providers:   Jamie Blair OB;AYLEEN PENA;BRITNEY VERDE;SHI MOCK;JAMEY BULLOCK JR;YANNICK ARCHER;ROBBY GARCIA;REGULO TANG;ROSE MARIE JOHNSON, Obstetrician;Primary Nurse;Primary [place] Nurse;Neonatologist;Anesthesiologist;Crna;Scrub Tech;Scrub Tech;Respiratory Therapist;Student Nurse             Group B Strep: No results found for: GRBSEXT, GRBSEXT  Information for the patient's :  Merced Andrea [[id number]]   No results found for: ABORH, PCTABR, PCTDIG, BILI, ABORHEXT, ABORH    No results for input(s): PCO2CB, PO2CB, HCO3I, SO2I, IBD, PTEMPI, SPECTI, PHICB, ISITE, IDEV, IALLEN in the last 72 hours.

## 2019-09-30 NOTE — PROGRESS NOTES
09/30/19 0719   Peripheral IV 09/30/19 Anterior;Right Forearm   Placement Date/Time: 09/30/19 6157   Number of Attempts: 1  Inserted By: Cindi Oneal  Present on Admission/Arrival: No  Size: 18 G  Orientation: Anterior;Right  Location: Forearm   Site Assessment Clean, dry, & intact   Phlebitis Assessment 0   Infiltration Assessment 0   Dressing Status Clean, dry, & intact   Dressing Type Tape;Transparent   Hub Color/Line Status Green; Infusing   Action Taken Blood drawn

## 2019-09-30 NOTE — ROUTINE PROCESS
SBAR IN Report: Mother    Verbal report received from Daya Bahena RN on this patient, who is now being transferred from L&D for routine progression of care. The patient is wearing a green \"Anesthesia-Duramorph\" band. Report consisted of patient's Situation, Background, Assessment and Recommendations (SBAR). [place] ID bands were compared with the identification form, and verified with the patient and transferring nurse. Information from the SBAR, Kardex, OR Summary, Procedure Summary, Intake/Output, MAR, [place] and Recent Results and the [place] Report was reviewed with the transferring nurse; opportunity for questions and clarification provided.

## 2019-09-30 NOTE — H&P
[facility] H&P      Assessment/Plan    Problem List  Date Reviewed: 2019          Codes Class    History of primary  section ICD-10-CM: Z98.891  ICD-9-CM: V45.89         Maternal care for excessive fetal growth in third trimester ICD-10-CM: O36.63X0  ICD-9-CM: 656.63     Overview Addendum 2019 11:12 AM by Allie Mar NP     19 at [facility]:  EFW 87%, AC 94%, DARRELL 16.7 cm  2019: EFW 98%, AC 92%, all anatomy > 90%, DARRELL nl, vertex, BPP              Anemia during pregnancy in third trimester ICD-10-CM: O99.013  ICD-9-CM: 648.23     Overview Addendum 2019 11:01 AM by Allie Mar NP     Additional Fe    9/3/2019: 11.4             Low-lying placenta in third trimester ICD-10-CM: O44.43  ICD-9-CM: 641.03     Overview Addendum 2019 11:21 AM by Capri Whitehead MD     2019 at [facility]:  Previa tail comes to os. Should resolve on its own  2019 at [facility]: Placenta 1.7 cm from os  2019 at [facility]:  Previously seen low lying placenta (unable to visualize today). 19: not visualized on US today  2019 at [facility]:  Not visualized today. High-risk pregnancy in third trimester ICD-10-CM: O09.93  ICD-9-CM: V23.9     Overview Addendum 2019  3:55 PM by Maico Hernandez RN       Pt with hx interstitial lung disease, pericardial cyst and rheumatoid arthritis-not currently on meds  2019 at [facility]: Normal anatomy and echo; AC 92%, CL 5.7cm, posterior placenta previa noted, lower thin edge just covers internal os. Significant interstitial lung disease, Sats in 70's with walking, asymptomatic at rest, is on 2L O2 when sleeping. Has not had a sleep study done. Will await studies and info from Dr. Heather Silverman and team manage the pregnancy. At high risk of Preeclampsia and worsening pulmonary function. 2019 [facility] Note- Will contact Pulmonologist regarding below test results from yesterday.  -Cardiac Echo normal yesterday.   -PFTs-Moderately severe restrictive lung disease. 2019 at [facility]:  Appropriate fetal growth, accelerated. Reassuring fetal status; AC 87%, Overall 61%, DARRELL 17 cm Dopplers WNL, BPP 8/8, CL 5.1 cm. Pulmonary testing revealed restrictive disease, no Pulmonary HTN. No symptoms at rest, mild SOB with exertion, supplemental Oxygen for sleep, will be getting Pulse Ox monitor, Sleep study to be don as needed. Currently being managed closely by Pulmonologist.  Discussed plan of expectant management, will see back sooner prn.    2019 at [facility]: Accelerated fetal growth. AC 97%, overall 76%, DARRELL 16 cm, UA Dopplers WNL. Unable to repeat echo due to fetal position. 2019 at [facility]:  Accelerated fetal growth, reassuring fetal status. Previously noted low lying placenta (unable to visualize today). AC 94%, overall 87%, DARRELL 16.7 cm, UA Dopplers WNL, BPP 8/8  2019 at [facility]:  Improved fetal growth, reassuring fetal status. AC 70%, overall 74%, DARRELL 16.9 cm, UA Dopplers WNL, BPP 8/8     · No follow up scheduled at [facility]; will see back prn. · Continue twice weekly fetal testing at 32 weeks in primary OB office. · Preeclamptic warnings given. Rh negative state in antepartum period ICD-10-CM: O26.899, Z67.91  ICD-9-CM: 646.83     Overview Addendum 2019 11:53 AM by King Cachorro MD     Rhogam at 28 weeks (given 19) and PP, if indicated             Primigravida of advanced maternal age in third trimester ICD-10-CM: O09.513  ICD-9-CM: 659.53     Overview Addendum 2019 11:29 AM by King Cachorro MD     [facility] by LMP confirmed by 8 5 week US  PLAN PRIMARY C/S DUE TO LUNG CONCERNS - PLAN agreed upon by anesthesia and MFM  Scheduled for 19 @ 0900 (sched with Gloria Perry 19 @ 36)    PLAN:  NIPT (neg x 3, male), baby ASA x 2, MFM referral for fetal echo/anatomy,  testing around 35 weeks with delivery around 39 weeks    2019 at [facility]: Normal anatomy and echo.              Interstitial lung disease [facility]) ICD-10-CM: J84.9  ICD-9-CM: 039     Overview Addendum 2019  3:44 PM by Maico Hernandez RN     Mgmt per Pulm (Dr. Susan Viera)    2019 at [facility]: Unknown severity of Pulmonary function or possible pulmonry hypertension. Assymptomatic at rest, very symptomatic withi walking. She has an echo and pulmonology appt today. She is concerned about having a vag delivery and pushing with her limited lung function. 2019: Anesthesia Consult scheduled for 2019 @ 1500  2019 [facility]: stable PFTs; O2 while sleeping or if activity >3-5 minutes. Stable echo. Both pulmonology and anesthesia recommend epidural for pain control over spinal.   2019 at [facility]:  Symptoms stable. Uses O2 throughout the night and during the day if increased activity. S/p Anesthesia consult on .  2019 at [facility]:  Symptoms stable; continues to use O2 throughout night/increased activity. · Discussion of best route for delivery; pt leaning towards scheduled cs due to severity of symptoms with exertion  · Defer to pulmonology; next appt in 2019. Maternal rheumatoid arthritis complicating pregnancy [facility]) ICD-10-CM: O99.89, M06.9  ICD-9-CM: 648.70, 714.0     Overview Addendum 2019  3:43 PM by Maico Hernandez RN     2019 at [facility]: Stable - no meds currently  2019 at [facility]:  Symptoms stable (joint stiffness to back, neck, fingers, ankles). Has been referred to Dr. Lorenzo Andrea, but hasn't made an appt yet. 2019 at [facility]:  Stable symptoms. · Defer management to rheumatology. Jemma Younganda Akbar Lundberg 46 y.o.  39w1d  presented to L&D for C/S. Pt has no complaints today. Pt denies vaginal bleeding/discharge. No LOF.  +FM. D/W pt at length risks/benefits of procedure including but not limited to death, bleeding, infection and damage to other internal organs. She exhibited full understanding and wishes to proceed.       OB History  Para Term  AB Living   1 0 0 0 0 0   SAB TAB Ectopic Molar Multiple Live Births   0 0 0 0 0 0      # Outcome Date GA Lbr Dima/2nd Weight Sex Delivery Anes PTL Lv   1 Current                Past Medical History:   Diagnosis Date    Anemia during pregnancy in third trimester 2019    Arthritis, rheumatoid (HCC)     Bowel obstruction (HCC)     no surgery done    Chronic respiratory failure with hypoxia ([facility] [address] 3/20/2019    Disease of blood and blood forming organ     Insomnia     Lung interstitial disease ([facility] [address]     Pericardial cyst     Rhesus isoimmunization affecting pregnancy     Rh negative    Spondylosis        Past Surgical History:   Procedure Laterality Date    HX TONSILLECTOMY         Family History   Problem Relation Age of Onset    Breast Cancer Mother 61    Uterine Cancer Mother 36    Hypertension Mother     Elevated Lipids Mother     Cancer Father         skin    Arthritis Father     Colon Cancer Neg Hx     Ovarian Cancer Neg Hx        Social History     Socioeconomic History    Marital status:      Spouse name: Lima Chan Number of children: Not on file    Years of education: Not on file    Highest education level:  Bachelor's degree (e.g., BA, AB, BS)   Occupational History    Not on file   Social Needs    Financial resource strain: Not on file    Food insecurity:     Worry: Not on file     Inability: Not on file    Transportation needs:     Medical: Not on file     Non-medical: Not on file   Tobacco Use    Smoking status: Never Smoker    Smokeless tobacco: Never Used   Substance and Sexual Activity    Alcohol use: No     Frequency: Never    Drug use: No    Sexual activity: Yes     Birth control/protection: None   Lifestyle    Physical activity:     Days per week: Not on file     Minutes per session: Not on file    Stress: Not on file   Relationships    Social connections:     Talks on phone: Not on file     Gets together: Not on file     Attends Congregational service: Not on file     Active member of club or organization: Not on file     Attends meetings of clubs or organizations: Not on file     Relationship status: Not on file    Intimate partner violence:     Fear of current or ex partner: Not on file     Emotionally abused: Not on file     Physically abused: Not on file     Forced sexual activity: Not on file   Other Topics Concern     Service Not Asked    Blood Transfusions Not Asked    Caffeine Concern No    Occupational Exposure Not Asked    Hobby Hazards Not Asked    Sleep Concern Not Asked    Stress Concern Not Asked    Weight Concern Not Asked    Special Diet Not Asked    Back Care Not Asked    Exercise No    Bike Helmet Not Asked    Seat Belt Yes    Self-Exams Yes   Social History Narrative    Abuse: Feels safe at home, no history of physical abuse, no history of sexual abuse       Allergies   Allergen Reactions    Iodine Anaphylaxis    Betadine [Povidone-Iodine] Hives         Review of Systems:    Constitutional: No fevers or chills     Prenatal: + fetal movement, no VB/DC, no LOF     CV: No chest pain or palpatations    Resp: No SOB or cough    GI: No nausea/vomiting/diarrhea/constipation    Neuro: No HA, no seizure like activity    Skin: No rashes or lesions     Breast: No breast pain    : No dysuria or hematuria      Prenatal Record Review    The prenatal record has been reviewed. Prenatal Labs:   No results found for: RUBELLAEXT, GRBSEXT, HBSAGEXT, HIVEXT, RPREXT, GONNOEXT, CHLAMEXT    Objective    Visit Vitals  /87 (BP 1 Location: Left arm, BP Patient Position: At rest)   Pulse (!) 101   Temp 97.9 °F (36.6 °C)   Resp 16   Wt 259 lb (117.5 kg)   LMP 12/30/2018 (Approximate)   SpO2 98%   Breastfeeding?  Yes   BMI 35.13 kg/m²       Physical Exam    Gen: alert and cooperative, NAD    HEENT: NCAT    CV: RRR    RESP: CTA bilat    ABD: Gravid, soft, NT    EXT: trace edema bilat    NEURO: No focal deficits    SKIN: No noted rashes or lesions       Ian Chandler MD  8:14 AM  09/30/19

## 2019-09-30 NOTE — PROGRESS NOTES
Pt admitted to Room 422 for scheduled . Admission assessment completed. Discussed plan of care with patient. Discussed pt's choice of infant feeding method. Feeding options explored, including the importance of exclusive breastfeeding. Pt informed of our breastfeeding support system from from nursing staff and lactation staff during inpatient stay and following discharge. Questions and concerns addressed at this time. Pt confirms breastfeeding is her decision at this time.

## 2019-10-01 LAB
ERYTHROCYTE [DISTWIDTH] IN BLOOD BY AUTOMATED COUNT: 14.8 % (ref 11.9–14.6)
HCT VFR BLD AUTO: 29.4 % (ref 35.8–46.3)
HGB BLD-MCNC: 9.9 G/DL (ref 11.7–15.4)
MCH RBC QN AUTO: 30.1 PG (ref 26.1–32.9)
MCHC RBC AUTO-ENTMCNC: 33.7 G/DL (ref 31.4–35)
MCV RBC AUTO: 89.4 FL (ref 79.6–97.8)
NRBC # BLD: 0 K/UL (ref 0–0.2)
PLATELET # BLD AUTO: 160 K/UL (ref 150–450)
PMV BLD AUTO: 10.6 FL (ref 9.4–12.3)
RBC # BLD AUTO: 3.29 M/UL (ref 4.05–5.2)
WBC # BLD AUTO: 12.4 K/UL (ref 4.3–11.1)

## 2019-10-01 PROCEDURE — 36415 COLL VENOUS BLD VENIPUNCTURE: CPT

## 2019-10-01 PROCEDURE — 74011250637 HC RX REV CODE- 250/637: Performed by: OBSTETRICS & GYNECOLOGY

## 2019-10-01 PROCEDURE — 74011250636 HC RX REV CODE- 250/636: Performed by: OBSTETRICS & GYNECOLOGY

## 2019-10-01 PROCEDURE — 85027 COMPLETE CBC AUTOMATED: CPT

## 2019-10-01 PROCEDURE — 74011250637 HC RX REV CODE- 250/637: Performed by: ANESTHESIOLOGY

## 2019-10-01 PROCEDURE — 77030027138 HC INCENT SPIROMETER -A

## 2019-10-01 PROCEDURE — 65270000029 HC RM PRIVATE

## 2019-10-01 PROCEDURE — 74011250636 HC RX REV CODE- 250/636: Performed by: ANESTHESIOLOGY

## 2019-10-01 RX ORDER — SODIUM CHLORIDE 0.9 % (FLUSH) 0.9 %
5-40 SYRINGE (ML) INJECTION AS NEEDED
Status: DISCONTINUED | OUTPATIENT
Start: 2019-10-01 | End: 2019-10-01

## 2019-10-01 RX ORDER — ZOLPIDEM TARTRATE 5 MG/1
5 TABLET ORAL
Status: DISCONTINUED | OUTPATIENT
Start: 2019-10-01 | End: 2019-10-02 | Stop reason: HOSPADM

## 2019-10-01 RX ORDER — SIMETHICONE 80 MG
80 TABLET,CHEWABLE ORAL
Status: DISCONTINUED | OUTPATIENT
Start: 2019-10-01 | End: 2019-10-02 | Stop reason: HOSPADM

## 2019-10-01 RX ORDER — IBUPROFEN 600 MG/1
600 TABLET ORAL EVERY 6 HOURS
Status: DISCONTINUED | OUTPATIENT
Start: 2019-10-01 | End: 2019-10-02 | Stop reason: HOSPADM

## 2019-10-01 RX ORDER — PRENATAL VIT 96/IRON FUM/FOLIC 27MG-0.8MG
1 TABLET ORAL DAILY
Status: DISCONTINUED | OUTPATIENT
Start: 2019-10-01 | End: 2019-10-02 | Stop reason: HOSPADM

## 2019-10-01 RX ORDER — SODIUM CHLORIDE, SODIUM LACTATE, POTASSIUM CHLORIDE, CALCIUM CHLORIDE 600; 310; 30; 20 MG/100ML; MG/100ML; MG/100ML; MG/100ML
150 INJECTION, SOLUTION INTRAVENOUS CONTINUOUS
Status: DISCONTINUED | OUTPATIENT
Start: 2019-10-01 | End: 2019-10-01

## 2019-10-01 RX ORDER — DIPHENHYDRAMINE HCL 25 MG
25 CAPSULE ORAL
Status: DISCONTINUED | OUTPATIENT
Start: 2019-10-01 | End: 2019-10-02 | Stop reason: HOSPADM

## 2019-10-01 RX ORDER — SODIUM CHLORIDE 0.9 % (FLUSH) 0.9 %
5-40 SYRINGE (ML) INJECTION EVERY 8 HOURS
Status: DISCONTINUED | OUTPATIENT
Start: 2019-10-01 | End: 2019-10-01

## 2019-10-01 RX ORDER — OXYCODONE HYDROCHLORIDE 5 MG/1
5-10 TABLET ORAL
Status: DISCONTINUED | OUTPATIENT
Start: 2019-10-01 | End: 2019-10-02 | Stop reason: HOSPADM

## 2019-10-01 RX ORDER — MORPHINE SULFATE 10 MG/ML
5 INJECTION, SOLUTION INTRAMUSCULAR; INTRAVENOUS
Status: DISCONTINUED | OUTPATIENT
Start: 2019-10-01 | End: 2019-10-01

## 2019-10-01 RX ORDER — DOCUSATE SODIUM 100 MG/1
100 CAPSULE, LIQUID FILLED ORAL 2 TIMES DAILY
Status: DISCONTINUED | OUTPATIENT
Start: 2019-10-01 | End: 2019-10-02 | Stop reason: HOSPADM

## 2019-10-01 RX ADMIN — OXYCODONE HYDROCHLORIDE 5 MG: 5 TABLET ORAL at 14:15

## 2019-10-01 RX ADMIN — IBUPROFEN 600 MG: 600 TABLET, FILM COATED ORAL at 17:54

## 2019-10-01 RX ADMIN — DOCUSATE SODIUM 100 MG: 100 CAPSULE, LIQUID FILLED ORAL at 10:00

## 2019-10-01 RX ADMIN — OXYCODONE HYDROCHLORIDE 10 MG: 5 TABLET ORAL at 10:20

## 2019-10-01 RX ADMIN — PRENATAL VIT W/ FE FUMARATE-FA TAB 27-0.8 MG 1 TABLET: 27-0.8 TAB at 10:00

## 2019-10-01 RX ADMIN — OXYCODONE HYDROCHLORIDE 5 MG: 5 TABLET ORAL at 22:25

## 2019-10-01 RX ADMIN — KETOROLAC TROMETHAMINE 30 MG: 30 INJECTION, SOLUTION INTRAMUSCULAR at 04:58

## 2019-10-01 RX ADMIN — IBUPROFEN 600 MG: 600 TABLET, FILM COATED ORAL at 23:54

## 2019-10-01 RX ADMIN — ACETAMINOPHEN 1000 MG: 500 TABLET, FILM COATED ORAL at 03:41

## 2019-10-01 RX ADMIN — IBUPROFEN 600 MG: 600 TABLET, FILM COATED ORAL at 11:53

## 2019-10-01 RX ADMIN — OXYCODONE HYDROCHLORIDE 5 MG: 5 TABLET ORAL at 21:24

## 2019-10-01 RX ADMIN — RHO(D) IMMUNE GLOBULIN (HUMAN) 0.3 MG: 1500 SOLUTION INTRAMUSCULAR at 14:15

## 2019-10-01 RX ADMIN — OXYCODONE HYDROCHLORIDE 5 MG: 5 TABLET ORAL at 00:36

## 2019-10-01 NOTE — LACTATION NOTE
This note was copied from a baby's chart. Lactation visit. Mom states baby sleepy most of morning and then circumcised, has not fed in 6 hours now. Reviewed feeding expectations especially following circumcision procedure and normal sleepiness. Reviewed waking measures. Void diaper changed and baby roused easily from sleep. Reviewed suck practice. Baby with slightly recessed chin but sucks well on glove finger. Assisted at the breast in cross cradle hold. Short nipples. Took a few tries to get baby to sustain latch. Baby finally latched well and stayed on. Manual lip flange needed. Reviewed signs of good latch with mom. Baby feeding actively now. Encouraged mom to offer both breasts since has been over 6 hours since baby last fed well. Mom agreeable. Offered lactation assistance as needed today. RN updated.

## 2019-10-01 NOTE — PROGRESS NOTES
Chart reviewed- first time parent.  met with family and provided education on [facility] Postpartum  Home Visit Program.  Family declined referral for home visit.  provided informational packet on  mood disorder education/resources. Family receptive to receiving information and denied any additional needs from . Family has 's contact information should any needs/questions arise.     BALWINDER Nugent  [place]   [phone]

## 2019-10-01 NOTE — PROGRESS NOTES
Pt is S/P primary  at 44 1/7 weeks due to interstitial lung disease. No complaints today. Normal PO pain. No GI/ issues. Lochia < menses. No F/C. Visit Vitals  /64 (BP 1 Location: Right arm, BP Patient Position: At rest)   Pulse 69   Temp 97.4 °F (36.3 °C)   Resp 18   Wt 259 lb (117.5 kg)   SpO2 95%   Breastfeeding? Yes   BMI 35.13 kg/m²        CV - RRR  LUNGS - CTA bilaterally  ABD - soft, NABS, appropriate tenderness, incision C/D/I  EXT - tr edema bilaterally    Labs:    Recent Results (from the past 24 hour(s))   FETAL SCREEN    Collection Time: 19  4:10 PM   Result Value Ref Range    Fetal screen NEG     Comment IMMUCOR LOT [id number] EXP 10.11.19    CBC W/O DIFF    Collection Time: 10/01/19  6:12 AM   Result Value Ref Range    WBC 12.4 (H) 4.3 - 11.1 K/uL    RBC 3.29 (L) 4.05 - 5.2 M/uL    HGB 9.9 (L) 11.7 - 15.4 g/dL    HCT 29.4 (L) 35.8 - 46.3 %    MCV 89.4 79.6 - 97.8 FL    MCH 30.1 26.1 - 32.9 PG    MCHC 33.7 31.4 - 35.0 g/dL    RDW 14.8 (H) 11.9 - 14.6 %    PLATELET 492 177 - 950 K/uL    MPV 10.6 9.4 - 12.3 FL    ABSOLUTE NRBC 0.00 0.0 - 0.2 K/uL       POD #1 LTCS     Pt is breast feeding and plans on P4 method for birth control. Stable, cont.  routine PP/PO care    Jocelyne Kuo MD  8:48 AM  10/01/19

## 2019-10-01 NOTE — PROGRESS NOTES
Anesthesiology  Post-op Note    Post-op day 1 s/p  via spinal with neuraxial opioids for post-op pain management. Visit Vitals  /62 (BP 1 Location: Left arm, BP Patient Position: At rest)   Pulse 67   Temp 36.4 °C (97.5 °F)   Resp 17   Wt 117.5 kg (259 lb)   LMP 2018 (Approximate)   SpO2 95%   Breastfeeding? Yes   BMI 35.13 kg/m²     Airway patent, patient appropriately hydrated and appears euvolemic. Patient is Alert and oriented. Pain is well controlled. Pruritus is absent. Nausea is absent. No complaints about back or site of injection. Motor and sensory function has returned to baseline in lower extremities. Patient is satisfied with anesthetic and reports no complications. Continue current orders, then initiate surgeon's orders for pain management 24 hours after . Follow up per surgeon. Pt denies any breathing issues. Sitting up in bed, respirations unlabored.

## 2019-10-01 NOTE — PROGRESS NOTES
IV converted to hep well. SCD sleeves and nguyen catheter removed. Up to bathroom with assistance. Viji care taught and returned demonstration. Verbalizes understanding. Returned to bed. No assistance needed. Ice water provided and encouraged to drink lots. Understanding verbalized.

## 2019-10-02 VITALS
BODY MASS INDEX: 35.13 KG/M2 | TEMPERATURE: 97.9 F | HEART RATE: 71 BPM | SYSTOLIC BLOOD PRESSURE: 114 MMHG | WEIGHT: 259 LBS | DIASTOLIC BLOOD PRESSURE: 70 MMHG | RESPIRATION RATE: 18 BRPM | OXYGEN SATURATION: 98 %

## 2019-10-02 PROCEDURE — 74011250637 HC RX REV CODE- 250/637: Performed by: OBSTETRICS & GYNECOLOGY

## 2019-10-02 PROCEDURE — 74011250636 HC RX REV CODE- 250/636: Performed by: OBSTETRICS & GYNECOLOGY

## 2019-10-02 PROCEDURE — 90686 IIV4 VACC NO PRSV 0.5 ML IM: CPT | Performed by: OBSTETRICS & GYNECOLOGY

## 2019-10-02 PROCEDURE — 90715 TDAP VACCINE 7 YRS/> IM: CPT | Performed by: OBSTETRICS & GYNECOLOGY

## 2019-10-02 PROCEDURE — 90471 IMMUNIZATION ADMIN: CPT

## 2019-10-02 RX ORDER — OXYCODONE AND ACETAMINOPHEN 5; 325 MG/1; MG/1
1 TABLET ORAL
Qty: 28 TAB | Refills: 0 | Status: SHIPPED | OUTPATIENT
Start: 2019-10-02 | End: 2019-10-09

## 2019-10-02 RX ORDER — IBUPROFEN 600 MG/1
600 TABLET ORAL EVERY 6 HOURS
Qty: 60 TAB | Refills: 0 | Status: SHIPPED | OUTPATIENT
Start: 2019-10-02 | End: 2020-09-09

## 2019-10-02 RX ADMIN — INFLUENZA VIRUS VACCINE 0.5 ML: 15; 15; 15; 15 SUSPENSION INTRAMUSCULAR at 12:27

## 2019-10-02 RX ADMIN — SIMETHICONE CHEW TAB 80 MG 80 MG: 80 TABLET ORAL at 07:54

## 2019-10-02 RX ADMIN — TETANUS TOXOID, REDUCED DIPHTHERIA TOXOID AND ACELLULAR PERTUSSIS VACCINE, ADSORBED 0.5 ML: 5; 2.5; 8; 8; 2.5 SUSPENSION INTRAMUSCULAR at 12:06

## 2019-10-02 RX ADMIN — IBUPROFEN 600 MG: 600 TABLET, FILM COATED ORAL at 06:10

## 2019-10-02 RX ADMIN — OXYCODONE HYDROCHLORIDE 5 MG: 5 TABLET ORAL at 07:54

## 2019-10-02 RX ADMIN — IBUPROFEN 600 MG: 600 TABLET, FILM COATED ORAL at 12:06

## 2019-10-02 NOTE — PROGRESS NOTES
Bedside report received from [place], [facility]. Patient care assumed.

## 2019-10-02 NOTE — LACTATION NOTE
This note was copied from a baby's chart. Per mother, infant has not successfully latched since lactation assisted around 1500. Mother attempted to feed at 200 and then again now. Infant too sleepy. Encouraged mother to pump if infant is not latching and actively sucking for 15 minutes on each side to provide stimulation to assist in milk production. Mother willing to learn. Hospital grade pump brought to room and this RN set up and demonstrated use. Mother to call out after 15 minutes of pumping.

## 2019-10-02 NOTE — DISCHARGE INSTRUCTIONS
Patient Education        After Your Delivery (the Postpartum Period): Care Instructions  Your Care Instructions    Congratulations on the birth of your baby. Like pregnancy, the  period can be a time of excitement, maria t, and exhaustion. You may look at your wondrous little baby and feel happy. You may also be overwhelmed by your new sleep hours and new responsibilities. At first, babies often sleep during the days and are awake at night. They do not have a pattern or routine. They may make sudden gasps, jerk themselves awake, or look like they have crossed eyes. These are all normal, and they may even make you smile. In these first weeks after delivery, try to take good care of yourself. It may take 4 to 6 weeks to feel like yourself again, and possibly longer if you had a  birth. You will likely feel very tired for several weeks. Your days will be full of ups and downs, but lots of maria t as well. Follow-up care is a key part of your treatment and safety. Be sure to make and go to all appointments, and call your doctor if you are having problems. It's also a good idea to know your test results and keep a list of the medicines you take. How can you care for yourself at home? Take care of your body after delivery  · Use pads instead of tampons for the bloody flow that may last as long as 2 weeks. · Ease cramps with ibuprofen (Advil, Motrin). · Ease soreness of hemorrhoids and the area between your vagina and rectum with ice compresses or witch hazel pads. · Ease constipation by drinking lots of fluid and eating high-fiber foods. Ask your doctor about over-the-counter stool softeners. · Cleanse yourself with a gentle squeeze of warm water from a bottle instead of wiping with toilet paper. · Take a sitz bath in warm water several times a day. · Wear a good nursing bra. Ease sore and swollen breasts with warm, wet washcloths.   · If you are not breastfeeding, use ice rather than heat for breast soreness. · Your period may not start for several months if you are breastfeeding. You may bleed more, and longer at first, than you did before you got pregnant. · Wait until you are healed (about 4 to 6 weeks) before you have sexual intercourse. Your doctor will tell you when it is okay to have sex. · Try not to travel with your baby for 5 or 6 weeks. If you take a long car trip, make frequent stops to walk around and stretch. Avoid exhaustion  · Rest every day. Try to nap when your baby naps. · Ask another adult to be with you for a few days after delivery. · Plan for  if you have other children. · Stay flexible so you can eat at odd hours and sleep when you need to. Both you and your baby are making new schedules. · Plan small trips to get out of the house. Change can make you feel less tired. · Ask for help with housework, cooking, and shopping. Remind yourself that your job is to care for your baby. Know about help for postpartum depression  · \"Baby blues\" are common for the first 1 to 2 weeks after birth. You may cry or feel sad or irritable for no reason. · Rest whenever you can. Being tired makes it harder to handle your emotions. · Go for walks with your baby. · Talk to your partner, friends, and family about your feelings. · If your symptoms last for more than a few weeks, or if you feel very depressed, ask your doctor for help. · Postpartum depression can be treated. Support groups and counseling can help. Sometimes medicine can also help. Stay healthy  · Eat healthy foods so you have more energy and lose extra baby pounds. · If you breastfeed, avoid drugs. If you quit smoking during pregnancy, try to stay smoke-free. If you choose to have a drink now and then, have only one drink, and limit the number of occasions that you have a drink. Wait to breastfeed at least 2 hours after you have a drink to reduce the amount of alcohol the baby may get in the milk.   · Start daily exercise after 4 to 6 weeks, but rest when you feel tired. · Learn exercises to tone your belly. Do Kegel exercises to regain strength in your pelvic muscles. You can do these exercises while you stand or sit. ? Squeeze the same muscles you would use to stop your urine. Your belly and thighs should not move. ? Hold the squeeze for 3 seconds, and then relax for 3 seconds. ? Start with 3 seconds. Then add 1 second each week until you are able to squeeze for 10 seconds. ? Repeat the exercise 10 to 15 times for each session. Do three or more sessions each day. · Find a class for new mothers and new babies that has an exercise time. · If you had a  birth, give yourself a bit more time before you exercise, and be careful. When should you call for help? Call 911 anytime you think you may need emergency care. For example, call if:    · You have thoughts of harming yourself, your baby, or another person.     · You passed out (lost consciousness).     · You have chest pain, are short of breath, or cough up blood.     · You have a seizure.    Call your doctor now or seek immediate medical care if:    · You have severe vaginal bleeding. This means you are passing blood clots and soaking through a pad each hour for 2 or more hours.     · You are dizzy or lightheaded, or you feel like you may faint.     · You have a fever.     · You have new or more belly pain.     · You have signs of a blood clot in your leg (called a deep vein thrombosis), such as:  ? Pain in the calf, back of the knee, thigh, or groin. ? Redness and swelling in your leg or groin.     · You have signs of preeclampsia, such as:  ? Sudden swelling of your face, hands, or feet. ? New vision problems (such as dimness, blurring, or seeing spots).   ? A severe headache.    Watch closely for changes in your health, and be sure to contact your doctor if:    · Your vaginal bleeding seems to be getting heavier.     · You have new or worse vaginal discharge.     · You feel sad, anxious, or hopeless for more than a few days.     · You do not get better as expected. Where can you learn more? Go to http://magaly-surendra.info/. Enter A461 in the search box to learn more about \"After Your Delivery (the Postpartum Period): Care Instructions. \"  Current as of: May 29, 2019  Content Version: 12.2  © 8881-8146 [employer]. Care instructions adapted under license by [employer] (which disclaims liability or warranty for this information). If you have questions about a medical condition or this instruction, always ask your healthcare professional. [place] [address] any warranty or liability for your use of this information. DISCHARGE SUMMARY from Nurse    PATIENT INSTRUCTIONS:    After general anesthesia or intravenous sedation, for 24 hours or while taking prescription Narcotics:  · Limit your activities  · Do not drive and operate hazardous machinery  · Do not make important personal or business decisions  · Do  not drink alcoholic beverages  · If you have not urinated within 8 hours after discharge, please contact your surgeon on call. Report the following to your surgeon:  · Excessive pain, swelling, redness or odor of or around the surgical area  · Temperature over 100.5  · Nausea and vomiting lasting longer than 4 hours or if unable to take medications  · Any signs of decreased circulation or nerve impairment to extremity: change in color, persistent  numbness, tingling, coldness or increase pain  · Any questions    What to do at Home:    Discharge instruction to follow: Activity: Pelvis rest for 6 weeks     No heavy lifting over 15 lbs for 2 weeks     No driving for 2 weeks     No push/pull motion such as sweeping or vacuuming for 2 weeks     No tub baths for 6 weeks     section keep incision clean and dry, may shower as normal with soap and water.    Inspect incision every day for signs of infection listed below. Continue to use semaj-bottle with every void or bowel movement until comfortable stopping. Change sanitary pad after each urination or bowel movement. Call MD for the following:      Fever over 101 F; pain not relieved by medication; foul smelling vaginal discharge or increase in vaginal bleeding. Redness, swelling, or drainage from  incision. Take medication as prescribed. Follow up with MD as order. *  Please give a list of your current medications to your Primary Care Provider. *  Please update this list whenever your medications are discontinued, doses are      changed, or new medications (including over-the-counter products) are added. *  Please carry medication information at all times in case of emergency situations. These are general instructions for a healthy lifestyle:    No smoking/ No tobacco products/ Avoid exposure to second hand smoke  Surgeon General's Warning:  Quitting smoking now greatly reduces serious risk to your health. Obesity, smoking, and sedentary lifestyle greatly increases your risk for illness    A healthy diet, regular physical exercise & weight monitoring are important for maintaining a healthy lifestyle    You may be retaining fluid if you have a history of heart failure or if you experience any of the following symptoms:  Weight gain of 3 pounds or more overnight or 5 pounds in a week, increased swelling in our hands or feet or shortness of breath while lying flat in bed. Please call your doctor as soon as you notice any of these symptoms; do not wait until your next office visit. The discharge information has been reviewed with the {PATIENT PARENT GUARDIAN:17210}. The {PATIENT PARENT GUARDIAN:18770} verbalized understanding.   Discharge medications reviewed with the {Dishcar meds reviewed BXYL:35752} and appropriate educational materials and side effects teaching were provided.   ___________________________________________________________________________________________________________________________________

## 2019-10-02 NOTE — LACTATION NOTE
This note was copied from a baby's chart. Mother called out for this RN to assist with collection of colostrum. 0.6 ml collected with straight syringe and taught and demonstrated how to feed back to baby. Parents demonstrated understanding. Infant took well. After infant finished, mother requested to still give formula after as she is concerned he is not getting enough. Offered to assist mother in how to curve tip syringe formula. Mother opted out for slow-flow nipple. Similac brought to room and educated to always start infant at breast, then pump, then formula. Mother understands. Mother to call out with any other need of assistance.

## 2019-10-02 NOTE — PROGRESS NOTES
Pt is S/P primary  at 44 1/7 weeks due to interstitial lung disease. No complaints today. Normal PO pain. No GI/ issues. Lochia < menses. No F/C. Visit Vitals  /70 (BP 1 Location: Left arm, BP Patient Position: At rest)   Pulse 71   Temp 97.9 °F (36.6 °C)   Resp 18   Wt 259 lb (117.5 kg)   SpO2 98%   Breastfeeding? Yes   BMI 35.13 kg/m²        CV - RRR  LUNGS - CTA bilaterally  ABD - soft, NABS, appropriate tenderness, incision C/D/I  EXT - tr edema bilaterally    Labs:  No results found for this or any previous visit (from the past 24 hour(s)). POD #2 LTCS     Pt is breast feeding and plans on P4 method for birth control. Stable, cont.  routine PP/PO instructions    Felisha Rizzo MD  12:34 PM  10/02/19

## 2019-10-02 NOTE — PROGRESS NOTES
Patient discharged to home per MD orders. Discharge instructions reviewed with patient. Questions encouraged and answered. Prescription given, Patient verbalizes understanding.          Patient to call out when ready to be escorted out

## 2019-10-02 NOTE — LACTATION NOTE
This note was copied from a baby's chart. Individualized Feeding Plan for Breastfeeding   Lactation Services [phone]      As much as possible, hold your baby on your chest so babys bare skin is against your bare skin with a blanket covering babys back, especially 30 minutes before it is time for baby to eat. Watch for early feeding cues such as, licking lips, sucking motions, rooting, hands to mouth. Crying is a late feeding cue. Feed your baby at least 8 times in 24 hours, or more if your baby is showing feeding cues. If baby is sleepy put baby skin to skin and watch for hunger cues. To rouse baby: unwrap, undress, massage hands, feet, & back, change diaper, gently change babys position from lying to sitting. 15-20 minutes on the first breast of active breastfeeding is considered a good feeding. Good, active breastfeeding is when baby is alert, tugging the nipple, their ear may move, and you can hear swallows. Allow baby to finish the first side before changing sides. Sleeping at the breast or only brief, light sucks should not be considered a good, full breastfeed. At each feeding:  __x__1. Do Suck Practice on finger before each feeding until sucking pattern is smooth. Try using index finger. Nail down towards tongue. __x__2. Hand Express for a few minutes prior to latching to help start milk flow. __x__3. Baby needs to NURSE WELL x 15-20 minutes on at least first breast, burp and offer 2nd breast at every feeding. If no sustained latch only attempt at breast for 10 minutes. If baby does NOT latch on and feed well on at least one side, you should:   __x__4. Double pump for 15 minutes with breast massage and compression. Hand express for an additional 2-3 minutes per side. Pump after each feeding attempt or poor feeding, up to 8 times per day. If you are not putting baby to the breast you need to pump 8 times a day. Pump every 3 hours. __x__5.  Give baby all of the breast milk you obtain using a straight syringe or  curved syringe. Can supplement formula as desired. Follow chart below for intake needs. AVERAGE INTAKES OF COLOSTRUM BY HEALTHY  INFANTS:  Time  Day Intake (ml/feed)  Based on 8 feedings per day. 1st 24 hrs  1 2-10 ml  24-48 hrs  2 5-15 ml  48-72 hrs  3 30 ml (1 oz)  72-96 hrs  4 45 ml (1.5oz)  Amount listed is per feeding                          5-6       60 ml (2oz)                           7          90ml (3oz)    By day 7, baby will need 90 ml or 3 oz at each feeding based on 8 feedings per day & babys weight. (1oz = 30ml). Total milk volume needed in 24 hours by Day 7 is 24-26.0 oz per day based on baby's birthweight of 9-14. The more often baby eats, the less volume they need per feeding. If baby is eating more often than the minimum of 8 times per day, they may take less per feeding. Comments: Use feeding plan until follow up with pediatrician. Continue to attempt at the breast for most feeds. Pump every 3 hours if no latch. Give all pumped colostrum/breastmilk at each feeding. Formula supplement as needed until milk in.

## 2019-10-02 NOTE — LACTATION NOTE
This note was copied from a baby's chart. Attempting to assist mother in latching infant to right breast in cross cradle. Infant rooting. Infant unsuccessful in latching and becoming frustrated at the breast. Infant becomes inconsolable. Mother requests formula be given now to console infant. Mother gives infant similac and infant is consoled immediately. Reminded mother importance of pumping if infant unsuccessful at latching. Mother verbalizes that she will be diligent with pumping throughout today. Mother to call out with any assistance.

## 2019-10-02 NOTE — LACTATION NOTE
Lactation visit. Baby stopped latching well last night. Fussy and frantic at the breast. RN started mom pumping and mom requested to supplement via bottle. Has been attempting at the breast each time but baby still not latching. Mom pumping every feeding. Currently using pump now. Pumping one breast at a time now, encouraged double pumping for time saving. Mom pumping ~1ml. Reviewed normal colostrum volume expectations. Continue with diligent pumping if baby not latching. Baby taking bottle well, averaging 30ml per feed. Feeding plan given and reviewed, copy for home given. Discussed intake needs based on age and weight. Rental pump completed for home use. Reviewed engorgement and discussed nipple shield option once milk in if baby still not latching well once milk in. Questions answered. Reviewed save milk storage guidelines.

## 2019-10-02 NOTE — LACTATION NOTE
Early discharge. Mom and baby are going home today. Continue to offer the breast without restriction. Mom's milk should be fully in over the next few days. Reviewed engorgement precautions. Hand Expression has been demoed and written hand-out reviewed. As milk comes in baby will be more alert at the breast and swallows will be more obvious. Breasts may feel softer once baby has finished nursing. Baby should be back to birth weight by 3weeks of age. And then gain on average 1 oz per day for the next 2-3 months. Reviewed babies should be exclusively breastfeeding for the first 6 months and that breastfeeding should continue after introduction of appropriate complimentary foods after 6 months. Initial output should be at least 1 wet and 1 bowel movement for each day old baby is. By day 5-7 once milk is fully in baby will consistently have 6 or more soaking wet diapers and about 4 bowel movement. Some babies have a bowel movement with every feeding and some have 1-3 large bowel movements each day. Inadequate output may indicate inadequate feedings and should be reported to your Pediatrician. Bowel habits may change as baby gets older. Encouraged follow-up at Pediatrician in 1-2 days, no later than 1 week of age. Call [facility] for any questions as needed or to set up an OP visit. OP phone calls are returned within 24 hours. Community Breastfeeding Resource List given.

## 2019-10-09 NOTE — DISCHARGE SUMMARY
Date of Admission:  2019  6:38 AM  Date of Discharge:  10/2/2019  2:32 PM    Encounter Diagnoses   Name Primary?   delivery delivered Yes        Patient is a 46 y.o.  at 39w1d wks who was admitted for primary . The indication for  was interstitial lung disease. A Low Transverse  was performed with normal amount of blood loss. On post-op day #1, the patient had her catheter removed and was ambulating well in the [place]ma. Her post operative hemoglobin was stable. Patient had a normal post operative course. Incision stayed clean and dry, without erythema. Patient remained afebrile throughout the entire hospital stay. On day of discharge, she was discharged home in good condition with routine post  instructions. She was breast feeding the infant and plans on P4 method for birth control. Pt was scheduled to follow up in two weeks for an incision check at [address]. Discharge Meds:    Discharge Medication List as of 10/2/2019  1:06 PM      START taking these medications    Details   ibuprofen (MOTRIN) 600 mg tablet Take 1 Tab by mouth every six (6) hours. , Normal, Disp-60 Tab, R-0      oxyCODONE-acetaminophen (PERCOCET) 5-325 mg per tablet Take 1 Tab by mouth every six (6) hours as needed for Pain for up to 7 days. Max Daily Amount: 4 Tabs., Print, Disp-28 Tab, R-0         CONTINUE these medications which have NOT CHANGED    Details   lansoprazole (PREVACID) 30 mg capsule Take 1 Cap by mouth Daily (before breakfast). , Normal, Disp-30 Cap, R-6      ferrous sulfate (IRON) 325 mg (65 mg iron) cpER Take  by mouth two (2) times a day., Historical Med      Oxygen Historical Med      magnesium 250 mg tab Take 250 mg by mouth., Historical Med      prenatal multivit-ca-min-fe-fa (PRENATAL VITAMIN) tab Take  by mouth., Historical Med      sucralfate (CARAFATE) 100 mg/mL suspension Take  by mouth four (4) times daily. , Historical Med         STOP taking these medications       diphenhydrAMINE-acetaminophen (TYLENOL PM EXTRA STRENGTH)  mg tab Comments:   Reason for Stopping:               Victor Manuel Bustillo MD  8:52 AM  10/09/19

## 2019-10-10 ENCOUNTER — HOSPITAL ENCOUNTER (EMERGENCY)
Age: 39
Discharge: LEFT BEFORE DISCHARGE | End: 2019-10-10
Attending: EMERGENCY MEDICINE
Payer: COMMERCIAL

## 2019-10-10 VITALS
HEART RATE: 74 BPM | OXYGEN SATURATION: 100 % | HEIGHT: 72 IN | RESPIRATION RATE: 16 BRPM | WEIGHT: 230 LBS | DIASTOLIC BLOOD PRESSURE: 85 MMHG | BODY MASS INDEX: 31.15 KG/M2 | TEMPERATURE: 98.1 F | SYSTOLIC BLOOD PRESSURE: 136 MMHG

## 2019-10-10 DIAGNOSIS — R03.0 ELEVATED BLOOD PRESSURE READING WITHOUT DIAGNOSIS OF HYPERTENSION: Primary | ICD-10-CM

## 2019-10-10 LAB
ALBUMIN SERPL-MCNC: 3.2 G/DL (ref 3.5–5)
ALBUMIN/GLOB SERPL: 0.9 {RATIO} (ref 1.2–3.5)
ALP SERPL-CCNC: 90 U/L (ref 50–136)
ALT SERPL-CCNC: 17 U/L (ref 12–65)
ANION GAP SERPL CALC-SCNC: 7 MMOL/L (ref 7–16)
AST SERPL-CCNC: 16 U/L (ref 15–37)
ATRIAL RATE: 83 BPM
BACTERIA URNS QL MICRO: 0 /HPF
BASOPHILS # BLD: 0 K/UL (ref 0–0.2)
BASOPHILS NFR BLD: 0 % (ref 0–2)
BILIRUB SERPL-MCNC: 0.8 MG/DL (ref 0.2–1.1)
BNP SERPL-MCNC: 26 PG/ML
BUN SERPL-MCNC: 11 MG/DL (ref 6–23)
CALCIUM SERPL-MCNC: 8.9 MG/DL (ref 8.3–10.4)
CALCULATED P AXIS, ECG09: 50 DEGREES
CALCULATED R AXIS, ECG10: 54 DEGREES
CALCULATED T AXIS, ECG11: 63 DEGREES
CASTS URNS QL MICRO: 0 /LPF
CHLORIDE SERPL-SCNC: 105 MMOL/L (ref 98–107)
CO2 SERPL-SCNC: 27 MMOL/L (ref 21–32)
CREAT SERPL-MCNC: 0.64 MG/DL (ref 0.6–1)
DIAGNOSIS, 93000: NORMAL
DIFFERENTIAL METHOD BLD: NORMAL
EOSINOPHIL # BLD: 0.1 K/UL (ref 0–0.8)
EOSINOPHIL NFR BLD: 1 % (ref 0.5–7.8)
EPI CELLS #/AREA URNS HPF: NORMAL /HPF
ERYTHROCYTE [DISTWIDTH] IN BLOOD BY AUTOMATED COUNT: 14 % (ref 11.9–14.6)
GLOBULIN SER CALC-MCNC: 3.6 G/DL (ref 2.3–3.5)
GLUCOSE SERPL-MCNC: 95 MG/DL (ref 65–100)
HCT VFR BLD AUTO: 37.3 % (ref 35.8–46.3)
HGB BLD-MCNC: 12.4 G/DL (ref 11.7–15.4)
IMM GRANULOCYTES # BLD AUTO: 0 K/UL (ref 0–0.5)
IMM GRANULOCYTES NFR BLD AUTO: 0 % (ref 0–5)
LYMPHOCYTES # BLD: 1.8 K/UL (ref 0.5–4.6)
LYMPHOCYTES NFR BLD: 19 % (ref 13–44)
MCH RBC QN AUTO: 29.4 PG (ref 26.1–32.9)
MCHC RBC AUTO-ENTMCNC: 33.2 G/DL (ref 31.4–35)
MCV RBC AUTO: 88.4 FL (ref 79.6–97.8)
MONOCYTES # BLD: 0.5 K/UL (ref 0.1–1.3)
MONOCYTES NFR BLD: 6 % (ref 4–12)
NEUTS SEG # BLD: 6.9 K/UL (ref 1.7–8.2)
NEUTS SEG NFR BLD: 74 % (ref 43–78)
NRBC # BLD: 0 K/UL (ref 0–0.2)
P-R INTERVAL, ECG05: 154 MS
PLATELET # BLD AUTO: 337 K/UL (ref 150–450)
PMV BLD AUTO: 9.5 FL (ref 9.4–12.3)
POTASSIUM SERPL-SCNC: 3.9 MMOL/L (ref 3.5–5.1)
PROT SERPL-MCNC: 6.8 G/DL (ref 6.3–8.2)
Q-T INTERVAL, ECG07: 364 MS
QRS DURATION, ECG06: 80 MS
QTC CALCULATION (BEZET), ECG08: 427 MS
RBC # BLD AUTO: 4.22 M/UL (ref 4.05–5.2)
RBC #/AREA URNS HPF: NORMAL /HPF
SODIUM SERPL-SCNC: 139 MMOL/L (ref 136–145)
TROPONIN I SERPL-MCNC: <0.02 NG/ML (ref 0.02–0.05)
VENTRICULAR RATE, ECG03: 83 BPM
WBC # BLD AUTO: 9.4 K/UL (ref 4.3–11.1)
WBC URNS QL MICRO: NORMAL /HPF

## 2019-10-10 PROCEDURE — 83880 ASSAY OF NATRIURETIC PEPTIDE: CPT

## 2019-10-10 PROCEDURE — 85025 COMPLETE CBC W/AUTO DIFF WBC: CPT

## 2019-10-10 PROCEDURE — 84484 ASSAY OF TROPONIN QUANT: CPT

## 2019-10-10 PROCEDURE — 81015 MICROSCOPIC EXAM OF URINE: CPT

## 2019-10-10 PROCEDURE — 93005 ELECTROCARDIOGRAM TRACING: CPT

## 2019-10-10 PROCEDURE — 80053 COMPREHEN METABOLIC PANEL: CPT

## 2019-10-10 PROCEDURE — 74011250637 HC RX REV CODE- 250/637: Performed by: EMERGENCY MEDICINE

## 2019-10-10 PROCEDURE — 99285 EMERGENCY DEPT VISIT HI MDM: CPT | Performed by: EMERGENCY MEDICINE

## 2019-10-10 RX ORDER — LABETALOL 100 MG/1
200 TABLET, FILM COATED ORAL
Status: COMPLETED | OUTPATIENT
Start: 2019-10-10 | End: 2019-10-10

## 2019-10-10 RX ORDER — LABETALOL 100 MG/1
200 TABLET, FILM COATED ORAL 2 TIMES DAILY
Qty: 120 TAB | Refills: 3 | Status: SHIPPED | OUTPATIENT
Start: 2019-10-10 | End: 2019-10-22

## 2019-10-10 RX ADMIN — LABETALOL HYDROCHLORIDE 200 MG: 100 TABLET, FILM COATED ORAL at 21:16

## 2019-10-10 NOTE — ED TRIAGE NOTES
Pt states she had a C section about ten days ago and BP has been high the past few days with some swelling in her legs. States she called OB who told her to come to the ER. States she is not having headaches at this time.

## 2019-10-11 NOTE — ED PROVIDER NOTES
Patient is 10 days out from her scheduled . She states that her blood pressure normally runs in the 100/60 range but she has been running in the 140s/80s range over the past week or so. She also has been having lower extremity swelling. She denies any headache or blurred vision. She says that her swelling has worsened over the past several days though she has been on her feet quite a bit per her . She denies similar symptoms in the past, has not taken any medicine for her symptoms. She denies any abdominal or pelvic pain. Past Medical History:   Diagnosis Date    Anemia during pregnancy in third trimester 2019    Arthritis, rheumatoid (HCC)     Bowel obstruction (HCC)     no surgery done    Chronic respiratory failure with hypoxia ([address] 3/20/2019    Disease of blood and blood forming organ     Insomnia     Lung interstitial disease ([facility] [address]     Pericardial cyst     Rhesus isoimmunization affecting pregnancy     Rh negative    Spondylosis        Past Surgical History:   Procedure Laterality Date    HX TONSILLECTOMY           Family History:   Problem Relation Age of Onset    Breast Cancer Mother 61    Uterine Cancer Mother 36    Hypertension Mother     Elevated Lipids Mother     Cancer Father         skin    Arthritis Father     Colon Cancer Neg Hx     Ovarian Cancer Neg Hx        Social History     Socioeconomic History    Marital status:      Spouse name: Patrick Lugo Number of children: Not on file    Years of education: Not on file    Highest education level:  Bachelor's degree (e.g., BA, AB, BS)   Occupational History    Not on file   Social Needs    Financial resource strain: Not on file    Food insecurity:     Worry: Not on file     Inability: Not on file    Transportation needs:     Medical: Not on file     Non-medical: Not on file   Tobacco Use    Smoking status: Never Smoker    Smokeless tobacco: Never Used   Substance and Sexual Activity    Alcohol use: No     Frequency: Never    Drug use: No    Sexual activity: Yes     Birth control/protection: None   Lifestyle    Physical activity:     Days per week: Not on file     Minutes per session: Not on file    Stress: Not on file   Relationships    Social connections:     Talks on phone: Not on file     Gets together: Not on file     Attends Religion service: Not on file     Active member of club or organization: Not on file     Attends meetings of clubs or organizations: Not on file     Relationship status: Not on file    Intimate partner violence:     Fear of current or ex partner: Not on file     Emotionally abused: Not on file     Physically abused: Not on file     Forced sexual activity: Not on file   Other Topics Concern     Service Not Asked    Blood Transfusions Not Asked    Caffeine Concern No    Occupational Exposure Not Asked   Hector Kotyk Hazards Not Asked    Sleep Concern Not Asked    Stress Concern Not Asked    Weight Concern Not Asked    Special Diet Not Asked    Back Care Not Asked    Exercise No    Bike Helmet Not Asked    Seat Belt Yes    Self-Exams Yes   Social History Narrative    Abuse: Feels safe at home, no history of physical abuse, no history of sexual abuse         ALLERGIES: Iodine and Betadine [povidone-iodine]    Review of Systems   Constitutional: Negative for chills and fever. Gastrointestinal: Negative for nausea and vomiting. All other systems reviewed and are negative. Vitals:    10/10/19 1820   BP: (!) 158/92   Pulse: 96   Resp: 16   Temp: 98.1 °F (36.7 °C)   SpO2: 98%   Weight: 104.3 kg (230 lb)   Height: 6' (1.829 m)            Physical Exam   Constitutional: She is oriented to person, place, and time. She appears well-developed and well-nourished. HENT:   Head: Normocephalic and atraumatic. Eyes: Pupils are equal, round, and reactive to light. Conjunctivae are normal.   Neck: Normal range of motion. Neck supple.    Cardiovascular: Normal rate and regular rhythm. Pulmonary/Chest: Effort normal. No respiratory distress. Musculoskeletal: She exhibits no edema or tenderness. Neurological: She is alert and oriented to person, place, and time. Skin: Skin is warm and dry. Psychiatric: She has a normal mood and affect. Her behavior is normal.   Nursing note and vitals reviewed. MDM  Number of Diagnoses or Management Options  Elevated blood pressure reading without diagnosis of hypertension: new and requires workup  Diagnosis management comments: 8:45 PM Spoke with Dr Zane Anders who is on call for Dr Mio Patrick. He recommends putting her on labetalol 200 mg twice a day. He also recommends that she call the office tomorrow to get seen again for a blood pressure recheck. 10:13 PM last blood pressure was 120s/60s.   Discussed results with patient, she has an appointment with her obstetrician on Monday       Amount and/or Complexity of Data Reviewed  Clinical lab tests: ordered and reviewed  Discuss the patient with other providers: yes    Risk of Complications, Morbidity, and/or Mortality  Presenting problems: moderate  Diagnostic procedures: moderate  Management options: moderate    Patient Progress  Patient progress: improved         Procedures

## 2019-10-11 NOTE — DISCHARGE INSTRUCTIONS
Follow-up with your obstetrician on Monday as scheduled. Return to the emergency department if your symptoms worsen despite the prescription medicine.

## 2019-10-11 NOTE — ED NOTES
I have reviewed discharge instructions with the patient. The patient verbalized understanding. Patient left ED via Discharge Method: ambulatory to Home with self. Opportunity for questions and clarification provided. Patient given 1 scripts. To continue your aftercare when you leave the hospital, you may receive an automated call from our care team to check in on how you are doing. This is a free service and part of our promise to provide the best care and service to meet your aftercare needs.  If you have questions, or wish to unsubscribe from this service please call [phone]. Thank you for Choosing our [facility] Emergency Department.

## 2019-10-14 PROBLEM — O99.013 ANEMIA DURING PREGNANCY IN THIRD TRIMESTER: Status: RESOLVED | Noted: 2019-07-17 | Resolved: 2019-10-14

## 2019-10-14 PROBLEM — Z67.91 RH NEGATIVE STATE IN ANTEPARTUM PERIOD: Status: RESOLVED | Noted: 2019-03-07 | Resolved: 2019-10-14

## 2019-10-14 PROBLEM — O09.513 PRIMIGRAVIDA OF ADVANCED MATERNAL AGE IN THIRD TRIMESTER: Status: RESOLVED | Noted: 2019-02-27 | Resolved: 2019-10-14

## 2019-10-14 PROBLEM — Z98.891 HISTORY OF PRIMARY CESAREAN SECTION: Status: RESOLVED | Noted: 2019-09-30 | Resolved: 2019-10-14

## 2019-10-14 PROBLEM — O44.43 LOW-LYING PLACENTA IN THIRD TRIMESTER: Status: RESOLVED | Noted: 2019-05-13 | Resolved: 2019-10-14

## 2019-10-14 PROBLEM — O26.899 RH NEGATIVE STATE IN ANTEPARTUM PERIOD: Status: RESOLVED | Noted: 2019-03-07 | Resolved: 2019-10-14

## 2019-10-14 PROBLEM — O09.93 HIGH-RISK PREGNANCY IN THIRD TRIMESTER: Status: RESOLVED | Noted: 2019-05-13 | Resolved: 2019-10-14

## 2019-10-14 PROBLEM — Z09 POSTOPERATIVE EXAMINATION: Status: ACTIVE | Noted: 2019-10-14

## 2019-10-14 PROBLEM — O36.63X0 MATERNAL CARE FOR EXCESSIVE FETAL GROWTH IN THIRD TRIMESTER: Status: RESOLVED | Noted: 2019-07-25 | Resolved: 2019-10-14

## 2019-11-11 PROBLEM — O99.891 MATERNAL RHEUMATOID ARTHRITIS COMPLICATING PREGNANCY (HCC): Status: RESOLVED | Noted: 2019-02-27 | Resolved: 2019-11-11

## 2019-11-11 PROBLEM — Z09 POSTOPERATIVE EXAMINATION: Status: RESOLVED | Noted: 2019-10-14 | Resolved: 2019-11-11

## 2019-11-11 PROBLEM — M06.9 MATERNAL RHEUMATOID ARTHRITIS COMPLICATING PREGNANCY (HCC): Status: RESOLVED | Noted: 2019-02-27 | Resolved: 2019-11-11

## 2020-02-18 ENCOUNTER — APPOINTMENT (RX ONLY)
Dept: URBAN - METROPOLITAN AREA CLINIC 349 | Facility: CLINIC | Age: 40
Setting detail: DERMATOLOGY
End: 2020-02-18

## 2020-02-18 DIAGNOSIS — L81.4 OTHER MELANIN HYPERPIGMENTATION: ICD-10-CM

## 2020-02-18 DIAGNOSIS — Z71.89 OTHER SPECIFIED COUNSELING: ICD-10-CM

## 2020-02-18 DIAGNOSIS — D485 NEOPLASM OF UNCERTAIN BEHAVIOR OF SKIN: ICD-10-CM

## 2020-02-18 DIAGNOSIS — D22 MELANOCYTIC NEVI: ICD-10-CM

## 2020-02-18 DIAGNOSIS — L40.0 PSORIASIS VULGARIS: ICD-10-CM

## 2020-02-18 DIAGNOSIS — Z80.8 FAMILY HISTORY OF MALIGNANT NEOPLASM OF OTHER ORGANS OR SYSTEMS: ICD-10-CM

## 2020-02-18 PROBLEM — D48.5 NEOPLASM OF UNCERTAIN BEHAVIOR OF SKIN: Status: ACTIVE | Noted: 2020-02-18

## 2020-02-18 PROBLEM — D22.5 MELANOCYTIC NEVI OF TRUNK: Status: ACTIVE | Noted: 2020-02-18

## 2020-02-18 PROCEDURE — A4550 SURGICAL TRAYS: HCPCS

## 2020-02-18 PROCEDURE — 11102 TANGNTL BX SKIN SINGLE LES: CPT

## 2020-02-18 PROCEDURE — ? COUNSELING

## 2020-02-18 PROCEDURE — ? OBSERVATION

## 2020-02-18 PROCEDURE — ? BIOPSY BY SHAVE METHOD

## 2020-02-18 PROCEDURE — ? TREATMENT REGIMEN

## 2020-02-18 PROCEDURE — 99202 OFFICE O/P NEW SF 15 MIN: CPT | Mod: 25

## 2020-02-18 PROCEDURE — ? EDUCATIONAL RESOURCES PROVIDED

## 2020-02-18 PROCEDURE — ? PRESCRIPTION

## 2020-02-18 PROCEDURE — ? OTHER

## 2020-02-18 RX ORDER — FLUOCINONIDE 0.5 MG/ML
SOLUTION TOPICAL
Qty: 1 | Refills: 3 | Status: CANCELLED

## 2020-02-18 ASSESSMENT — LOCATION SIMPLE DESCRIPTION DERM
LOCATION SIMPLE: ABDOMEN
LOCATION SIMPLE: LEFT TEMPLE
LOCATION SIMPLE: RIGHT SCALP
LOCATION SIMPLE: LEFT CHEEK
LOCATION SIMPLE: RIGHT UPPER BACK
LOCATION SIMPLE: LEFT UPPER BACK
LOCATION SIMPLE: LEFT SHOULDER

## 2020-02-18 ASSESSMENT — LOCATION ZONE DERM
LOCATION ZONE: FACE
LOCATION ZONE: TRUNK
LOCATION ZONE: SCALP
LOCATION ZONE: ARM

## 2020-02-18 ASSESSMENT — LOCATION DETAILED DESCRIPTION DERM
LOCATION DETAILED: RIGHT SUPERIOR UPPER BACK
LOCATION DETAILED: RIGHT LATERAL ABDOMEN
LOCATION DETAILED: LEFT MID TEMPLE
LOCATION DETAILED: RIGHT MEDIAL FRONTAL SCALP
LOCATION DETAILED: LEFT SUPERIOR UPPER BACK
LOCATION DETAILED: LEFT ANTERIOR SHOULDER
LOCATION DETAILED: LEFT INFERIOR CENTRAL MALAR CHEEK

## 2020-02-18 NOTE — PROCEDURE: OTHER
Other (Free Text): If lesion is a dermatofibroma, explained the risk of recurrence with patient. She is in agreement with this plan and would like it biopsied
Detail Level: Zone
Note Text (......Xxx Chief Complaint.): This diagnosis correlates with the
Other (Free Text): Patient is being seen by a rheumatologist about her psoriasis. They are in the process of getting her on something. Patient stated she recently had a baby.
Other (Free Text): Has been present for ten years. Discussed bleaching creams and IPL laser. Will observe lesion in three months and if everything is stable, patient is okay to treat. Suggested to set up free consultation with Radha.

## 2020-02-18 NOTE — PROCEDURE: TREATMENT REGIMEN
Action 2: Continue
Detail Level: Zone
Start Regimen: Apply fluocinonide twice daily for two weeks \\n\\nOTC Head and shoulders

## 2020-02-18 NOTE — PROCEDURE: BIOPSY BY SHAVE METHOD
Consent: Written consent was obtained and risks were reviewed including but not limited to scarring, infection, bleeding, scabbing, incomplete removal, nerve damage and allergy to anesthesia.
Bill 96381 For Specimen Handling/Conveyance To Laboratory?: no
Electrodesiccation Text: The wound bed was treated with electrodesiccation after the biopsy was performed.
Detail Level: Detailed
Wound Care: Vaseline
Size Of Lesion In Cm: 0
Type Of Destruction Used: Curettage
Silver Nitrate Text: The wound bed was treated with silver nitrate after the biopsy was performed.
Biopsy Method: Personna blade
Depth Of Biopsy: dermis
Dressing: pressure dressing
Curettage Text: The wound bed was treated with curettage after the biopsy was performed.
Notification Instructions: Patient will be notified of biopsy results. However, patient instructed to call the office if not contacted within 2 weeks.
Path Notes (To The Dermatopathologist): Check margins
Anesthesia Volume In Cc (Will Not Render If 0): 0.3
Biopsy Type: H and E
Accession #: [id number]
Electrodesiccation And Curettage Text: The wound bed was treated with electrodesiccation and curettage after the biopsy was performed.
Billing Type: Third-Party Bill
Post-Care Instructions: I reviewed with the patient in detail post-care instructions. Patient is to keep the biopsy site dry overnight, and then apply bacitracin twice daily until healed. Patient may apply hydrogen peroxide soaks to remove any crusting.
Bill For Surgical Tray: yes
Anesthesia Type: 2% lidocaine with epinephrine
Hemostasis: Electrodesiccation
Cryotherapy Text: The wound bed was treated with cryotherapy after the biopsy was performed.

## 2020-06-05 ENCOUNTER — HOSPITAL ENCOUNTER (OUTPATIENT)
Dept: CT IMAGING | Age: 40
Discharge: HOME | End: 2020-06-05
Attending: INTERNAL MEDICINE

## 2020-06-05 DIAGNOSIS — R91.1 PULMONARY NODULE: ICD-10-CM

## 2020-06-10 NOTE — PROGRESS NOTES
Patient returned the call. Results have been reviewed. She was told radiology compared recent chest CT with her CT from [facility] of [facility]. She has multiple small nodules that are stable. There is evidence of progressive worsening of her ILD, recommend follow up HRCT in 6 months. Pericardial cyst is unchanged. She verbalized understanding. She stated she saw rheumatology this week. They were waiting for the CT results. She was appreciative of the call.

## 2020-06-10 NOTE — PROGRESS NOTES
Please let patient know that radiology compared recent chest CT with her CT from [facility] of [facility]. She has multiple small nodules that are stable. There is evidence of progressive worsening of her ILD, recommend follow up HRCT in 6 months. Pericardial cyst is unchanged. Needs to follow up with rheumatology ASAP. I attempted to call patient, but had to leave voice mail.

## 2020-08-31 ENCOUNTER — HOSPITAL ENCOUNTER (OUTPATIENT)
Dept: CARDIAC REHAB | Age: 40
Discharge: HOME | End: 2020-08-31

## 2020-08-31 RX ORDER — ZOLPIDEM TARTRATE 12.5 MG/1
12.5 TABLET, FILM COATED, EXTENDED RELEASE ORAL
COMMUNITY
End: 2020-09-09

## 2020-08-31 NOTE — CARDIO/PULMONARY
2020      Dear Dr. Yesi Jones: Thank you for referring your patient, Denae Adames (: 1980), to the Pulmonary Rehabilitation Program at [facility]. Ms. Frankie Adames is a good candidate for the program and should see improvements with regular participation. We will be working to increase your patient's endurance and self care over the next 12 weeks. We will contact you with any issues or concerns that may arise, or you can follow your patient's progress through [address] [place] [address] at any time. We will send you a final summary report when the program is completed. Again, thank you for your referral. If we can be of further assistance, please feel free to contact the Cardiopulmonary Rehab staff at [phone].     Sincerely,    Cassandra Singh, RRT, RCP  Pulmonary Rehab Specialist   [facility]    CC: File

## 2020-08-31 NOTE — CARDIO/PULMONARY
NOTE REGARDING PATIENT'S NEED FOR O2:   Patient states that her need for O2 at home is for climbing stairs to bedroom and nursery while holding her baby. In fact, she doesn't even attempt stairs with the baby anymore, but just waits all day until her  comes home from work. She has tried E cylinder in the past, but has been unable to navigate stairs with it. She has requested a POC, but given her self reported SpO2 levels in low 80's and upper 70's, a POC may not meet her O2 requirements. Given what she has told me, I feel continuous flow via a D cylinder in a backpack may better meet her needs at this time. She begins Pulmonary Rehab on 9/8/2020. She is very excited about beginning rehab, but I'm afraid we will be limited in progression without continuous flow O2. Thank you!   Suha Peterson, RRT

## 2020-09-08 ENCOUNTER — HOSPITAL ENCOUNTER (OUTPATIENT)
Dept: CARDIAC REHAB | Age: 40
Discharge: HOME | End: 2020-09-08
Payer: COMMERCIAL

## 2020-09-08 VITALS — HEIGHT: 72 IN | BODY MASS INDEX: 32.78 KG/M2 | WEIGHT: 242 LBS

## 2020-09-08 PROCEDURE — G0239 OTH RESP PROC, GROUP: HCPCS

## 2020-09-09 PROBLEM — Z86.79 HISTORY OF POSTPARTUM HYPERTENSION: Status: ACTIVE | Noted: 2020-09-09

## 2020-09-09 PROBLEM — O09.521 ELDERLY MULTIGRAVIDA, FIRST TRIMESTER: Status: ACTIVE | Noted: 2020-09-09

## 2020-09-09 PROBLEM — Z87.59 HISTORY OF POSTPARTUM HYPERTENSION: Status: ACTIVE | Noted: 2020-09-09

## 2020-09-09 PROBLEM — O09.291 HISTORY OF MACROSOMIA IN INFANT IN PRIOR PREGNANCY, CURRENTLY PREGNANT IN FIRST TRIMESTER: Status: ACTIVE | Noted: 2020-09-09

## 2020-09-09 PROBLEM — Z98.891 PREVIOUS CESAREAN SECTION: Status: ACTIVE | Noted: 2020-09-09

## 2020-09-10 ENCOUNTER — HOSPITAL ENCOUNTER (OUTPATIENT)
Dept: CARDIAC REHAB | Age: 40
Discharge: HOME | End: 2020-09-10
Payer: COMMERCIAL

## 2020-09-10 PROCEDURE — G0239 OTH RESP PROC, GROUP: HCPCS

## 2020-09-15 ENCOUNTER — HOSPITAL ENCOUNTER (OUTPATIENT)
Dept: CARDIAC REHAB | Age: 40
Discharge: HOME | End: 2020-09-15
Payer: COMMERCIAL

## 2020-09-15 VITALS — BODY MASS INDEX: 32.96 KG/M2 | WEIGHT: 243 LBS

## 2020-09-15 PROCEDURE — G0239 OTH RESP PROC, GROUP: HCPCS

## 2020-09-17 ENCOUNTER — HOSPITAL ENCOUNTER (OUTPATIENT)
Dept: CARDIAC REHAB | Age: 40
Discharge: HOME | End: 2020-09-17
Payer: COMMERCIAL

## 2020-09-17 PROCEDURE — G0239 OTH RESP PROC, GROUP: HCPCS

## 2020-09-22 ENCOUNTER — HOSPITAL ENCOUNTER (OUTPATIENT)
Dept: CARDIAC REHAB | Age: 40
Discharge: HOME | End: 2020-09-22
Payer: COMMERCIAL

## 2020-09-22 VITALS — BODY MASS INDEX: 32.79 KG/M2 | WEIGHT: 241.8 LBS

## 2020-09-22 PROCEDURE — G0239 OTH RESP PROC, GROUP: HCPCS

## 2020-09-24 ENCOUNTER — HOSPITAL ENCOUNTER (OUTPATIENT)
Dept: CARDIAC REHAB | Age: 40
Discharge: HOME | End: 2020-09-24
Payer: COMMERCIAL

## 2020-09-24 PROCEDURE — G0239 OTH RESP PROC, GROUP: HCPCS

## 2020-09-29 ENCOUNTER — APPOINTMENT (OUTPATIENT)
Dept: CARDIAC REHAB | Age: 40
End: 2020-09-29
Payer: COMMERCIAL

## 2020-10-01 ENCOUNTER — APPOINTMENT (OUTPATIENT)
Dept: CARDIAC REHAB | Age: 40
End: 2020-10-01
Payer: COMMERCIAL

## 2020-10-05 PROBLEM — O99.511 RESPIRATORY SYSTEM DISEASE AFFECTING PREGNANCY IN FIRST TRIMESTER, ANTEPARTUM: Status: ACTIVE | Noted: 2019-02-27

## 2020-10-05 PROBLEM — O34.219 HISTORY OF CESAREAN SECTION COMPLICATING PREGNANCY: Status: ACTIVE | Noted: 2020-09-09

## 2020-10-05 PROBLEM — Z36.82 NUCHAL TRANSLUCENCY OF FETUS ON PRENATAL ULTRASOUND: Status: ACTIVE | Noted: 2020-10-05

## 2020-10-06 ENCOUNTER — HOSPITAL ENCOUNTER (OUTPATIENT)
Dept: CARDIAC REHAB | Age: 40
Discharge: HOME | End: 2020-10-06
Payer: COMMERCIAL

## 2020-10-06 VITALS — WEIGHT: 243.4 LBS | BODY MASS INDEX: 33.01 KG/M2

## 2020-10-06 PROCEDURE — G0239 OTH RESP PROC, GROUP: HCPCS

## 2020-10-08 ENCOUNTER — HOSPITAL ENCOUNTER (OUTPATIENT)
Dept: CARDIAC REHAB | Age: 40
Discharge: HOME | End: 2020-10-08
Payer: COMMERCIAL

## 2020-10-13 ENCOUNTER — HOSPITAL ENCOUNTER (OUTPATIENT)
Dept: CARDIAC REHAB | Age: 40
Discharge: HOME | End: 2020-10-13
Payer: COMMERCIAL

## 2020-10-13 VITALS — WEIGHT: 242 LBS | BODY MASS INDEX: 32.82 KG/M2

## 2020-10-13 PROCEDURE — G0239 OTH RESP PROC, GROUP: HCPCS

## 2020-10-20 ENCOUNTER — HOSPITAL ENCOUNTER (OUTPATIENT)
Dept: CARDIAC REHAB | Age: 40
Discharge: HOME | End: 2020-10-20
Payer: COMMERCIAL

## 2020-10-20 VITALS — WEIGHT: 244 LBS | BODY MASS INDEX: 33.09 KG/M2

## 2020-10-20 PROCEDURE — G0239 OTH RESP PROC, GROUP: HCPCS

## 2020-10-22 ENCOUNTER — HOSPITAL ENCOUNTER (OUTPATIENT)
Dept: CARDIAC REHAB | Age: 40
Discharge: HOME | End: 2020-10-22
Payer: COMMERCIAL

## 2020-10-22 PROCEDURE — G0239 OTH RESP PROC, GROUP: HCPCS

## 2020-10-27 ENCOUNTER — HOSPITAL ENCOUNTER (OUTPATIENT)
Dept: CARDIAC REHAB | Age: 40
Discharge: HOME | End: 2020-10-27
Payer: COMMERCIAL

## 2020-10-27 VITALS — BODY MASS INDEX: 33.2 KG/M2 | WEIGHT: 244.8 LBS

## 2020-10-27 PROBLEM — O99.512 RESPIRATORY SYSTEM DISEASE COMPLICATING PREGNANCY IN SECOND TRIMESTER: Status: ACTIVE | Noted: 2019-02-27

## 2020-10-27 PROCEDURE — G0239 OTH RESP PROC, GROUP: HCPCS

## 2020-10-29 ENCOUNTER — APPOINTMENT (OUTPATIENT)
Dept: CARDIAC REHAB | Age: 40
End: 2020-10-29
Payer: COMMERCIAL

## 2020-10-30 PROBLEM — E03.8 SUBCLINICAL HYPOTHYROIDISM: Status: ACTIVE | Noted: 2020-10-30

## 2020-11-03 ENCOUNTER — HOSPITAL ENCOUNTER (OUTPATIENT)
Dept: CARDIAC REHAB | Age: 40
Discharge: HOME | End: 2020-11-03
Payer: COMMERCIAL

## 2020-11-03 VITALS — WEIGHT: 244.4 LBS | BODY MASS INDEX: 33.15 KG/M2

## 2020-11-03 PROCEDURE — G0239 OTH RESP PROC, GROUP: HCPCS

## 2020-11-05 ENCOUNTER — HOSPITAL ENCOUNTER (OUTPATIENT)
Dept: CARDIAC REHAB | Age: 40
Discharge: HOME | End: 2020-11-05
Payer: COMMERCIAL

## 2020-11-05 PROCEDURE — G0239 OTH RESP PROC, GROUP: HCPCS

## 2020-11-10 ENCOUNTER — HOSPITAL ENCOUNTER (OUTPATIENT)
Dept: CARDIAC REHAB | Age: 40
Discharge: HOME | End: 2020-11-10
Payer: COMMERCIAL

## 2020-11-10 VITALS — WEIGHT: 245 LBS | BODY MASS INDEX: 33.23 KG/M2

## 2020-11-10 PROBLEM — R00.2 HEART PALPITATIONS: Status: ACTIVE | Noted: 2020-11-10

## 2020-11-10 PROCEDURE — G0239 OTH RESP PROC, GROUP: HCPCS

## 2020-11-12 ENCOUNTER — HOSPITAL ENCOUNTER (OUTPATIENT)
Dept: CARDIAC REHAB | Age: 40
Discharge: HOME | End: 2020-11-12
Payer: COMMERCIAL

## 2020-11-12 PROCEDURE — G0239 OTH RESP PROC, GROUP: HCPCS

## 2020-11-17 ENCOUNTER — HOSPITAL ENCOUNTER (OUTPATIENT)
Dept: CARDIAC REHAB | Age: 40
Discharge: HOME | End: 2020-11-17
Payer: COMMERCIAL

## 2020-11-17 VITALS — WEIGHT: 245.2 LBS | BODY MASS INDEX: 33.26 KG/M2

## 2020-11-17 PROCEDURE — G0239 OTH RESP PROC, GROUP: HCPCS

## 2020-11-18 PROBLEM — O09.522 MULTIGRAVIDA OF ADVANCED MATERNAL AGE IN SECOND TRIMESTER: Status: ACTIVE | Noted: 2020-09-09

## 2020-11-18 PROBLEM — O09.292: Status: ACTIVE | Noted: 2020-09-09

## 2020-11-18 PROBLEM — O43.199 MARGINAL INSERTION OF UMBILICAL CORD AFFECTING MANAGEMENT OF MOTHER: Status: ACTIVE | Noted: 2020-11-18

## 2020-11-18 PROBLEM — Z36.82 NUCHAL TRANSLUCENCY OF FETUS ON PRENATAL ULTRASOUND: Status: RESOLVED | Noted: 2020-10-05 | Resolved: 2020-11-18

## 2020-11-18 PROBLEM — E03.9 HYPOTHYROIDISM AFFECTING PREGNANCY IN SECOND TRIMESTER: Status: ACTIVE | Noted: 2020-10-30

## 2020-11-18 PROBLEM — O99.282 HYPOTHYROIDISM AFFECTING PREGNANCY IN SECOND TRIMESTER: Status: ACTIVE | Noted: 2020-10-30

## 2020-11-19 ENCOUNTER — HOSPITAL ENCOUNTER (OUTPATIENT)
Dept: CARDIAC REHAB | Age: 40
Discharge: HOME | End: 2020-11-19
Payer: COMMERCIAL

## 2020-11-19 PROCEDURE — G0239 OTH RESP PROC, GROUP: HCPCS

## 2020-11-24 ENCOUNTER — HOSPITAL ENCOUNTER (OUTPATIENT)
Dept: CARDIAC REHAB | Age: 40
Discharge: HOME | End: 2020-11-24
Payer: COMMERCIAL

## 2020-11-24 PROCEDURE — G0239 OTH RESP PROC, GROUP: HCPCS

## 2020-12-01 ENCOUNTER — APPOINTMENT (OUTPATIENT)
Dept: CARDIAC REHAB | Age: 40
End: 2020-12-01
Payer: COMMERCIAL

## 2020-12-03 ENCOUNTER — HOSPITAL ENCOUNTER (OUTPATIENT)
Dept: CARDIAC REHAB | Age: 40
Discharge: HOME | End: 2020-12-03
Payer: COMMERCIAL

## 2020-12-03 VITALS — BODY MASS INDEX: 33.58 KG/M2 | WEIGHT: 247.6 LBS

## 2020-12-03 PROCEDURE — G0239 OTH RESP PROC, GROUP: HCPCS

## 2020-12-08 ENCOUNTER — HOSPITAL ENCOUNTER (OUTPATIENT)
Dept: CARDIAC REHAB | Age: 40
Discharge: HOME | End: 2020-12-08
Payer: COMMERCIAL

## 2020-12-08 VITALS — BODY MASS INDEX: 33.66 KG/M2 | WEIGHT: 248.2 LBS

## 2020-12-08 PROCEDURE — G0239 OTH RESP PROC, GROUP: HCPCS

## 2020-12-10 ENCOUNTER — APPOINTMENT (OUTPATIENT)
Dept: CARDIAC REHAB | Age: 40
End: 2020-12-10
Payer: COMMERCIAL

## 2020-12-15 ENCOUNTER — HOSPITAL ENCOUNTER (OUTPATIENT)
Dept: CARDIAC REHAB | Age: 40
Discharge: HOME | End: 2020-12-15
Payer: COMMERCIAL

## 2020-12-15 PROCEDURE — G0239 OTH RESP PROC, GROUP: HCPCS

## 2020-12-17 ENCOUNTER — APPOINTMENT (OUTPATIENT)
Dept: CARDIAC REHAB | Age: 40
End: 2020-12-17
Payer: COMMERCIAL

## 2020-12-17 VITALS — OXYGEN SATURATION: 94 % | WEIGHT: 248.8 LBS | BODY MASS INDEX: 33.74 KG/M2

## 2020-12-22 ENCOUNTER — APPOINTMENT (OUTPATIENT)
Dept: CARDIAC REHAB | Age: 40
End: 2020-12-22
Payer: COMMERCIAL

## 2020-12-29 ENCOUNTER — HOSPITAL ENCOUNTER (OUTPATIENT)
Dept: CARDIAC REHAB | Age: 40
End: 2020-12-29
Payer: COMMERCIAL

## 2020-12-31 ENCOUNTER — APPOINTMENT (OUTPATIENT)
Dept: CARDIAC REHAB | Age: 40
End: 2020-12-31
Payer: COMMERCIAL

## 2021-01-05 ENCOUNTER — HOSPITAL ENCOUNTER (OUTPATIENT)
Dept: CARDIAC REHAB | Age: 41
End: 2021-01-05

## 2021-01-12 ENCOUNTER — APPOINTMENT (OUTPATIENT)
Dept: CARDIAC REHAB | Age: 41
End: 2021-01-12

## 2021-01-14 ENCOUNTER — APPOINTMENT (OUTPATIENT)
Dept: CARDIAC REHAB | Age: 41
End: 2021-01-14

## 2021-01-15 PROBLEM — O99.283 HYPOTHYROIDISM AFFECTING PREGNANCY IN THIRD TRIMESTER: Status: ACTIVE | Noted: 2020-10-30

## 2021-01-15 PROBLEM — O09.523 MULTIGRAVIDA OF ADVANCED MATERNAL AGE IN THIRD TRIMESTER: Status: ACTIVE | Noted: 2020-09-09

## 2021-01-19 PROBLEM — U07.1 COVID-19 AFFECTING PREGNANCY IN SECOND TRIMESTER: Status: ACTIVE | Noted: 2021-01-19

## 2021-01-19 PROBLEM — O98.512 COVID-19 AFFECTING PREGNANCY IN SECOND TRIMESTER: Status: ACTIVE | Noted: 2021-01-19

## 2021-01-20 PROBLEM — O09.293 PRIOR FETAL MACROSOMIA, ANTEPARTUM, THIRD TRIMESTER: Status: ACTIVE | Noted: 2020-09-09

## 2021-01-20 PROBLEM — O99.513 RESPIRATORY SYSTEM DISEASE COMPLICATING PREGNANCY IN THIRD TRIMESTER: Status: ACTIVE | Noted: 2019-02-27

## 2021-01-26 ENCOUNTER — HOSPITAL ENCOUNTER (OUTPATIENT)
Dept: CARDIAC REHAB | Age: 41
End: 2021-01-26

## 2021-02-11 PROBLEM — O99.013 ANEMIA AFFECTING PREGNANCY IN THIRD TRIMESTER: Status: ACTIVE | Noted: 2019-02-27

## 2021-02-11 PROBLEM — O99.013 ANEMIA AFFECTING PREGNANCY IN THIRD TRIMESTER: Status: ACTIVE | Noted: 2021-02-11

## 2021-02-25 ENCOUNTER — ANESTHESIA EVENT (OUTPATIENT)
Dept: LABOR AND DELIVERY | Age: 41
End: 2021-02-25
Payer: COMMERCIAL

## 2021-02-25 ENCOUNTER — HOSPITAL ENCOUNTER (OUTPATIENT)
Dept: LABOR AND DELIVERY | Age: 41
Discharge: HOME | End: 2021-02-25

## 2021-02-25 NOTE — ANESTHESIA PREPROCEDURE EVALUATION
Relevant Problems   ENDOCRINE   (+) Hypothyroidism affecting pregnancy in third trimester      HEMATOLOGY   (+) Anemia affecting pregnancy in third trimester       Anesthetic History               Review of Systems / Medical History  Patient summary reviewed, nursing notes reviewed and pertinent labs reviewed    Pulmonary          Shortness of breath      Comments: ILD previously requiring 2L NC O2 at night and with exertion. Now improved, no home O2.    Neuro/Psych             Comments: scoliosis Cardiovascular                  Exercise tolerance: <4 METS  Comments: Recent normal echo 2/3/21   GI/Hepatic/Renal     GERD: well controlled           Endo/Other        Obesity and arthritis (RA)     Other Findings            Physical Exam    Airway  Mallampati: I  TM Distance: 4 - 6 cm  Neck ROM: normal range of motion   Mouth opening: Normal     Cardiovascular    Rhythm: regular  Rate: normal         Dental  No notable dental hx       Pulmonary  Breath sounds clear to auscultation               Abdominal         Other Findings            Anesthetic Plan    ASA: 3  Anesthesia type: spinal            Anesthetic plan and risks discussed with: Patient and Spouse      Tolerated SAB for

## 2021-03-30 ENCOUNTER — HOSPITAL ENCOUNTER (INPATIENT)
Age: 41
LOS: 4 days | Discharge: HOME | End: 2021-04-01
Attending: OBSTETRICS & GYNECOLOGY | Admitting: OBSTETRICS & GYNECOLOGY
Payer: COMMERCIAL

## 2021-03-30 ENCOUNTER — ANESTHESIA (OUTPATIENT)
Dept: LABOR AND DELIVERY | Age: 41
End: 2021-03-30
Payer: COMMERCIAL

## 2021-03-30 LAB
ABO + RH BLD: NORMAL
BLOOD GROUP ANTIBODIES SERPL: NORMAL
ERYTHROCYTE [DISTWIDTH] IN BLOOD BY AUTOMATED COUNT: 16.4 % (ref 11.9–14.6)
HCT VFR BLD AUTO: 35.1 % (ref 35.8–46.3)
HGB BLD-MCNC: 11.8 G/DL (ref 11.7–15.4)
MCH RBC QN AUTO: 28.9 PG (ref 26.1–32.9)
MCHC RBC AUTO-ENTMCNC: 33.6 G/DL (ref 31.4–35)
MCV RBC AUTO: 86 FL (ref 79.6–97.8)
NRBC # BLD: 0 K/UL (ref 0–0.2)
PLATELET # BLD AUTO: 192 K/UL (ref 150–450)
PMV BLD AUTO: 10.5 FL (ref 9.4–12.3)
RBC # BLD AUTO: 4.08 M/UL (ref 4.05–5.2)
SPECIMEN EXP DATE BLD: NORMAL
WBC # BLD AUTO: 9.1 K/UL (ref 4.3–11.1)

## 2021-03-30 PROCEDURE — 77030003665 HC NDL SPN BBMI -A: Performed by: ANESTHESIOLOGY

## 2021-03-30 PROCEDURE — 77030009363 HC CUP VAC EXTRCT CNCI -B: Performed by: OBSTETRICS & GYNECOLOGY

## 2021-03-30 PROCEDURE — 85027 COMPLETE CBC AUTOMATED: CPT

## 2021-03-30 PROCEDURE — 76060000078 HC EPIDURAL ANESTHESIA: Performed by: OBSTETRICS & GYNECOLOGY

## 2021-03-30 PROCEDURE — 77030007880 HC KT SPN EPDRL BBMI -B: Performed by: ANESTHESIOLOGY

## 2021-03-30 PROCEDURE — 2709999900 HC NON-CHARGEABLE SUPPLY: Performed by: OBSTETRICS & GYNECOLOGY

## 2021-03-30 PROCEDURE — 74011000250 HC RX REV CODE- 250: Performed by: NURSE ANESTHETIST, CERTIFIED REGISTERED

## 2021-03-30 PROCEDURE — 74011250636 HC RX REV CODE- 250/636

## 2021-03-30 PROCEDURE — 74011000250 HC RX REV CODE- 250: Performed by: ANESTHESIOLOGY

## 2021-03-30 PROCEDURE — 77030002933 HC SUT MCRYL J&J -A: Performed by: OBSTETRICS & GYNECOLOGY

## 2021-03-30 PROCEDURE — 75410000003 HC RECOV DEL/VAG/CSECN EA 0.5 HR: Performed by: OBSTETRICS & GYNECOLOGY

## 2021-03-30 PROCEDURE — 74011000250 HC RX REV CODE- 250: Performed by: OBSTETRICS & GYNECOLOGY

## 2021-03-30 PROCEDURE — 74011250636 HC RX REV CODE- 250/636: Performed by: OBSTETRICS & GYNECOLOGY

## 2021-03-30 PROCEDURE — 76010000392 HC C SECN EA ADDL 0.5 HR: Performed by: OBSTETRICS & GYNECOLOGY

## 2021-03-30 PROCEDURE — 74011250636 HC RX REV CODE- 250/636: Performed by: ANESTHESIOLOGY

## 2021-03-30 PROCEDURE — 59510 CESAREAN DELIVERY: CPT | Performed by: OBSTETRICS & GYNECOLOGY

## 2021-03-30 PROCEDURE — 77030002966 HC SUT PDS J&J -A: Performed by: OBSTETRICS & GYNECOLOGY

## 2021-03-30 PROCEDURE — 86901 BLOOD TYPING SEROLOGIC RH(D): CPT

## 2021-03-30 PROCEDURE — 77030031139 HC SUT VCRL2 J&J -A: Performed by: OBSTETRICS & GYNECOLOGY

## 2021-03-30 PROCEDURE — 77030032490 HC SLV COMPR SCD KNE COVD -B: Performed by: OBSTETRICS & GYNECOLOGY

## 2021-03-30 PROCEDURE — 76010000391 HC C SECN FIRST 1 HR: Performed by: OBSTETRICS & GYNECOLOGY

## 2021-03-30 PROCEDURE — 74011250636 HC RX REV CODE- 250/636: Performed by: NURSE ANESTHETIST, CERTIFIED REGISTERED

## 2021-03-30 PROCEDURE — 65270000029 HC RM PRIVATE

## 2021-03-30 PROCEDURE — 74011250637 HC RX REV CODE- 250/637: Performed by: ANESTHESIOLOGY

## 2021-03-30 PROCEDURE — 77030011943: Performed by: OBSTETRICS & GYNECOLOGY

## 2021-03-30 RX ORDER — SODIUM CHLORIDE 0.9 % (FLUSH) 0.9 %
5-40 SYRINGE (ML) INJECTION EVERY 8 HOURS
Status: DISCONTINUED | OUTPATIENT
Start: 2021-03-30 | End: 2021-03-31 | Stop reason: SDUPTHER

## 2021-03-30 RX ORDER — SODIUM CHLORIDE, SODIUM LACTATE, POTASSIUM CHLORIDE, CALCIUM CHLORIDE 600; 310; 30; 20 MG/100ML; MG/100ML; MG/100ML; MG/100ML
150 INJECTION, SOLUTION INTRAVENOUS CONTINUOUS
Status: DISCONTINUED | OUTPATIENT
Start: 2021-03-30 | End: 2021-04-01 | Stop reason: HOSPADM

## 2021-03-30 RX ORDER — LEVOTHYROXINE SODIUM 75 UG/1
75 TABLET ORAL
Status: DISCONTINUED | OUTPATIENT
Start: 2021-03-30 | End: 2021-04-01 | Stop reason: HOSPADM

## 2021-03-30 RX ORDER — BUPIVACAINE HYDROCHLORIDE 7.5 MG/ML
INJECTION, SOLUTION INTRASPINAL
Status: COMPLETED | OUTPATIENT
Start: 2021-03-30 | End: 2021-03-30

## 2021-03-30 RX ORDER — KETOROLAC TROMETHAMINE 30 MG/ML
30 INJECTION, SOLUTION INTRAMUSCULAR; INTRAVENOUS
Status: DISPENSED | OUTPATIENT
Start: 2021-03-30 | End: 2021-03-31

## 2021-03-30 RX ORDER — KETOROLAC TROMETHAMINE 30 MG/ML
INJECTION, SOLUTION INTRAMUSCULAR; INTRAVENOUS AS NEEDED
Status: DISCONTINUED | OUTPATIENT
Start: 2021-03-30 | End: 2021-03-30 | Stop reason: HOSPADM

## 2021-03-30 RX ORDER — ONDANSETRON 2 MG/ML
INJECTION INTRAMUSCULAR; INTRAVENOUS AS NEEDED
Status: DISCONTINUED | OUTPATIENT
Start: 2021-03-30 | End: 2021-03-30 | Stop reason: HOSPADM

## 2021-03-30 RX ORDER — NALBUPHINE HYDROCHLORIDE 10 MG/ML
5 INJECTION, SOLUTION INTRAMUSCULAR; INTRAVENOUS; SUBCUTANEOUS
Status: ACTIVE | OUTPATIENT
Start: 2021-03-30 | End: 2021-03-31

## 2021-03-30 RX ORDER — METOCLOPRAMIDE 10 MG/1
10 TABLET ORAL ONCE
Status: COMPLETED | OUTPATIENT
Start: 2021-03-30 | End: 2021-03-30

## 2021-03-30 RX ORDER — ACETAMINOPHEN 500 MG
1000 TABLET ORAL EVERY 8 HOURS
Status: COMPLETED | OUTPATIENT
Start: 2021-03-30 | End: 2021-03-31

## 2021-03-30 RX ORDER — OXYTOCIN/RINGER'S LACTATE 30/500 ML
10 PLASTIC BAG, INJECTION (ML) INTRAVENOUS AS NEEDED
Status: DISCONTINUED | OUTPATIENT
Start: 2021-03-30 | End: 2021-04-01 | Stop reason: HOSPADM

## 2021-03-30 RX ORDER — SODIUM CHLORIDE, SODIUM LACTATE, POTASSIUM CHLORIDE, CALCIUM CHLORIDE 600; 310; 30; 20 MG/100ML; MG/100ML; MG/100ML; MG/100ML
INJECTION, SOLUTION INTRAVENOUS
Status: DISCONTINUED | OUTPATIENT
Start: 2021-03-30 | End: 2021-03-30 | Stop reason: HOSPADM

## 2021-03-30 RX ORDER — OXYTOCIN/RINGER'S LACTATE 30/500 ML
PLASTIC BAG, INJECTION (ML) INTRAVENOUS
Status: DISCONTINUED | OUTPATIENT
Start: 2021-03-30 | End: 2021-03-30 | Stop reason: HOSPADM

## 2021-03-30 RX ORDER — FAMOTIDINE 20 MG/1
20 TABLET, FILM COATED ORAL ONCE
Status: COMPLETED | OUTPATIENT
Start: 2021-03-30 | End: 2021-03-30

## 2021-03-30 RX ORDER — SODIUM CHLORIDE, SODIUM LACTATE, POTASSIUM CHLORIDE, CALCIUM CHLORIDE 600; 310; 30; 20 MG/100ML; MG/100ML; MG/100ML; MG/100ML
125 INJECTION, SOLUTION INTRAVENOUS CONTINUOUS
Status: DISCONTINUED | OUTPATIENT
Start: 2021-03-30 | End: 2021-03-31 | Stop reason: SDUPTHER

## 2021-03-30 RX ORDER — OXYTOCIN/RINGER'S LACTATE 30/500 ML
87.3 PLASTIC BAG, INJECTION (ML) INTRAVENOUS AS NEEDED
Status: DISCONTINUED | OUTPATIENT
Start: 2021-03-30 | End: 2021-04-01 | Stop reason: HOSPADM

## 2021-03-30 RX ORDER — SODIUM CHLORIDE 0.9 % (FLUSH) 0.9 %
5-40 SYRINGE (ML) INJECTION AS NEEDED
Status: DISCONTINUED | OUTPATIENT
Start: 2021-03-30 | End: 2021-03-31 | Stop reason: SDUPTHER

## 2021-03-30 RX ORDER — EPHEDRINE SULFATE/0.9% NACL/PF 50 MG/5 ML
SYRINGE (ML) INTRAVENOUS AS NEEDED
Status: DISCONTINUED | OUTPATIENT
Start: 2021-03-30 | End: 2021-03-30 | Stop reason: HOSPADM

## 2021-03-30 RX ORDER — CEFAZOLIN SODIUM/WATER 2 G/20 ML
2 SYRINGE (ML) INTRAVENOUS
Status: COMPLETED | OUTPATIENT
Start: 2021-03-30 | End: 2021-03-30

## 2021-03-30 RX ORDER — MORPHINE SULFATE 1 MG/ML
INJECTION, SOLUTION EPIDURAL; INTRATHECAL; INTRAVENOUS
Status: COMPLETED | OUTPATIENT
Start: 2021-03-30 | End: 2021-03-30

## 2021-03-30 RX ORDER — HYDROMORPHONE HYDROCHLORIDE 1 MG/ML
1 INJECTION, SOLUTION INTRAMUSCULAR; INTRAVENOUS; SUBCUTANEOUS
Status: ACTIVE | OUTPATIENT
Start: 2021-03-30 | End: 2021-03-31

## 2021-03-30 RX ORDER — DOCUSATE SODIUM 100 MG/1
100 CAPSULE, LIQUID FILLED ORAL 2 TIMES DAILY
COMMUNITY

## 2021-03-30 RX ORDER — TRISODIUM CITRATE DIHYDRATE AND CITRIC ACID MONOHYDRATE 500; 334 MG/5ML; MG/5ML
30 SOLUTION ORAL ONCE
Status: COMPLETED | OUTPATIENT
Start: 2021-03-30 | End: 2021-03-30

## 2021-03-30 RX ORDER — OXYCODONE HYDROCHLORIDE 5 MG/1
5 TABLET ORAL
Status: ACTIVE | OUTPATIENT
Start: 2021-03-30 | End: 2021-03-31

## 2021-03-30 RX ADMIN — KETOROLAC TROMETHAMINE 30 MG: 30 INJECTION, SOLUTION INTRAMUSCULAR at 09:51

## 2021-03-30 RX ADMIN — CEFAZOLIN SODIUM 2 G: 100 INJECTION, POWDER, LYOPHILIZED, FOR SOLUTION INTRAVENOUS at 09:09

## 2021-03-30 RX ADMIN — METOCLOPRAMIDE 10 MG: 10 TABLET ORAL at 08:13

## 2021-03-30 RX ADMIN — BUPIVACAINE HYDROCHLORIDE IN DEXTROSE 12 MG: 7.5 INJECTION, SOLUTION SUBARACHNOID at 09:20

## 2021-03-30 RX ADMIN — SODIUM CHLORIDE, SODIUM LACTATE, POTASSIUM CHLORIDE, AND CALCIUM CHLORIDE 125 ML/HR: 600; 310; 30; 20 INJECTION, SOLUTION INTRAVENOUS at 13:16

## 2021-03-30 RX ADMIN — Medication 10 MG: at 09:29

## 2021-03-30 RX ADMIN — Medication 10 MG: at 09:25

## 2021-03-30 RX ADMIN — FAMOTIDINE 20 MG: 20 TABLET, FILM COATED ORAL at 07:39

## 2021-03-30 RX ADMIN — SODIUM CHLORIDE, SODIUM LACTATE, POTASSIUM CHLORIDE, AND CALCIUM CHLORIDE 1000 ML: 600; 310; 30; 20 INJECTION, SOLUTION INTRAVENOUS at 07:20

## 2021-03-30 RX ADMIN — ONDANSETRON 4 MG: 2 INJECTION INTRAMUSCULAR; INTRAVENOUS at 09:41

## 2021-03-30 RX ADMIN — ACETAMINOPHEN 1000 MG: 500 TABLET, FILM COATED ORAL at 13:13

## 2021-03-30 RX ADMIN — PROMETHAZINE HYDROCHLORIDE 6.25 MG: 25 INJECTION INTRAMUSCULAR; INTRAVENOUS at 22:14

## 2021-03-30 RX ADMIN — SODIUM CHLORIDE, SODIUM LACTATE, POTASSIUM CHLORIDE, AND CALCIUM CHLORIDE 150 ML/HR: 600; 310; 30; 20 INJECTION, SOLUTION INTRAVENOUS at 16:05

## 2021-03-30 RX ADMIN — KETOROLAC TROMETHAMINE 30 MG: 30 INJECTION, SOLUTION INTRAMUSCULAR at 16:05

## 2021-03-30 RX ADMIN — OXYTOCIN 500 ML/HR: 10 INJECTION, SOLUTION INTRAMUSCULAR; INTRAVENOUS at 09:41

## 2021-03-30 RX ADMIN — ACETAMINOPHEN 1000 MG: 500 TABLET, FILM COATED ORAL at 19:57

## 2021-03-30 RX ADMIN — SODIUM CITRATE AND CITRIC ACID MONOHYDRATE 30 ML: 500; 334 SOLUTION ORAL at 08:13

## 2021-03-30 RX ADMIN — PHENYLEPHRINE HYDROCHLORIDE 100 MCG: 10 INJECTION INTRAVENOUS at 09:28

## 2021-03-30 RX ADMIN — KETOROLAC TROMETHAMINE 30 MG: 30 INJECTION, SOLUTION INTRAMUSCULAR at 22:15

## 2021-03-30 RX ADMIN — MORPHINE SULFATE 0.15 MG: 1 INJECTION, SOLUTION EPIDURAL; INTRATHECAL; INTRAVENOUS at 09:20

## 2021-03-30 RX ADMIN — PROMETHAZINE HYDROCHLORIDE 6.25 MG: 25 INJECTION INTRAMUSCULAR; INTRAVENOUS at 16:21

## 2021-03-30 RX ADMIN — SODIUM CHLORIDE, SODIUM LACTATE, POTASSIUM CHLORIDE, AND CALCIUM CHLORIDE: 600; 310; 30; 20 INJECTION, SOLUTION INTRAVENOUS at 09:12

## 2021-03-30 NOTE — PROGRESS NOTES
Admission Note    Presented for scheduledCesarean. POC reviewed, oriented to room and call light. IV started, lab work drawn and sent to lab. Consents witnessed.

## 2021-03-30 NOTE — PROGRESS NOTES
SBAR IN Report: Mother    Verbal report received from KEN Ruggiero on this patient, who is now being transferred from OR/PACU (unit) for routine progression of care. The patient is wearing a green \"Anesthesia-Duramorph\" band. Report consisted of patient's Situation, Background, Assessment and Recommendations (SBAR). [place] ID bands were compared with the identification form, and verified with the patient and transferring nurse. Information from the SBAR and the [place] Report was reviewed with the transferring nurse; opportunity for questions and clarification provided.

## 2021-03-30 NOTE — LACTATION NOTE
In to see mom and infant for the first time. Mom was nauseated and vomiting. Infant was asleep in dad's arms. Mom stated that infant has been latching and nursing well since birth. She stated that she had to pump with her first as well as breast feed. Assured her to just focus on breastfeeding for first 24 hours and reviewed the expectations of that time. She also asked about formula supplement and I informed her that at this time it is not medically necessary at this time. Lactation consultant will follow up tomorrow.

## 2021-03-30 NOTE — L&D DELIVERY NOTE
Delivery Summary    Patient: Ravindra Orlando MRN: [id number]  SSN: [id number]    YOB: 1980  Age: 45 y.o. Sex: female        Information for the patient's :  Rolla Curling [[id number]]       Labor Events:    Labor: No    Steroids:     Cervical Ripening Date/Time:       Cervical Ripening Type: None   Antibiotics During Labor:     Rupture Identifier: Sac 1    Rupture Date/Time: 3/30/2021 9:38 AM   Rupture Type: AROM   Amniotic Fluid Volume: Moderate    Amniotic Fluid Description: Clear    Amniotic Fluid Odor:      Induction:         Induction Date/Time:        Indications for Induction:      Augmentation:     Augmentation Date/Time:      Indications for Augmentation:     Labor complications: Additional complications:        Delivery Events:  Indications For Episiotomy:     Episiotomy:     Perineal Laceration(s):     Repaired:     Periurethral Laceration Location:      Repaired:     Labial Laceration Location:     Repaired:     Sulcal Laceration Location:     Repaired:     Vaginal Laceration Location:     Repaired:     Cervical Laceration Location:     Repaired:     Repair Suture:     Number of Repair Packets:     Estimated Blood Loss (ml):  ml   Quantitaive Blood Loss (ml):             Delivery Date: 3/30/2021    Delivery Time: 9:40 AM   Delivery Type: , Low Transverse     Details    Trial of Labor:     Primary/Repeat: Repeat   Priority: Routine   Indications:          Sex:  Male     Gestational Age: 39w0d  Delivery Clinician:  Joseluis Castro  Living Status: Living   Delivery Location: OR            APGARS  One minute Five minutes Ten minutes   Skin color: 1   1        Heart rate: 2   2        Grimace: 2   2        Muscle tone: 2   2        Breathin   2        Totals: 9   9          Presentation: Transverse    Position:        Resuscitation Method:  Suctioning-bulb; Tactile Stimulation     Meconium Stained: None      Cord Information: 3 Vessels  Complications:    Cord around:    Delayed cord clamping? Yes  Cord clamped date/time:   Disposition of Cord Blood: Lab    Blood Gases Sent?: No    Placenta:  Date/Time: 3/30/2021  9:40 AM  Removal: Expressed      Appearance: Normal     Superior Measurements:  Birth Weight: 7 lb 12.5 oz (3.53 kg)      Birth Length: 1' 9.06\" (0.535 m)      Head Circumference: 1' 2.17\" (0.36 m)      Chest Circumference: 1' 3.35\" (0.39 m)     Abdominal Girth: Other Providers:   ERNESTO HERNANDEZ;BORIS MCKOY;LES SOTO;ROBBY DENNEY;SHI MOCK;HANNA BULLOCK;YAYA HAMILTON;REGULO TANG;KIM ENCARNACION, Obstetrician;Primary Nurse;Primary Superior Nurse;Respiratory Therapist;Neonatologist;Anesthesiologist;Crna;Scrub Tech;Scrub Tech             Group B Strep: No results found for: GRBSEXT, GRBSEXT  Information for the patient's :  Ivan Alvarez [[id number]]   No results found for: ABORH, PCTABR, PCTDIG, BILI, ABORHEXT, ABORH     No results for input(s): PCO2CB, PO2CB, HCO3I, SO2I, IBD, PTEMPI, SPECTI, PHICB, ISITE, IDEV, IALLEN in the last 72 hours.

## 2021-03-30 NOTE — ANESTHESIA PROCEDURE NOTES
Spinal Block    Performed by: Gladis Paz MD  Authorized by: Gladis Paz MD     Pre-procedure: Indications: primary anesthetic  Preanesthetic Checklist: patient identified, risks and benefits discussed, anesthesia consent, patient being monitored and timeout performed    Timeout Time: 09:16  Preanesthetic Checklist comment:  Risk of nerve damage discussed.     Spinal Block:   Patient Position:  Seated  Prep Region:  Lumbar  Prep: chlorhexidine and patient draped      Location:  L3-4  Technique:  Single shot    Local Dose (mL):  3    Needle:   Needle Type:  Pencan  Needle Gauge:  25 G  Attempts:  1      Events: CSF confirmed, no blood with aspiration and no paresthesia        Assessment:  Insertion:  Uncomplicated  Patient tolerance:  Patient tolerated the procedure well with no immediate complications

## 2021-03-30 NOTE — PROGRESS NOTES
SBAR OUT Report: Mother    Verbal report given to [facility] (full name & credentials) on this patient, who is now being transferred to MIU (unit) for routine progression of care. The patient is wearing a green \"Anesthesia-Duramorph\" band. Report consisted of patient's Situation, Background, Assessment and Recommendations (SBAR).  ID bands were compared with the identification form, and verified with the patient and receiving nurse. Information from the SBAR and the [address] [facility] Report was reviewed with the receiving nurse; opportunity for questions and clarification provided. Monica Cloud

## 2021-03-30 NOTE — LACTATION NOTE

## 2021-03-30 NOTE — OP NOTES
Harriet Forrester  1980    Preop Dx:   1. IUP @ 39 weeks gestation   2. Previous C/S - desires repeat    Postop Dx: Same    Procedure: repeat LTCS    Surgeon: Carlos Velazquez      Anesthesia: spinal    EBL: 600 mL  IVF: 800 mL  UOP: 193 mL    Complications: None    Findings:  Viable Male infant. Vtx position. APGARS 9,9.  Weight:  7 lbs, 13 oz. Normal appearing uterus, tubes and ovaries. Procedure:  Pt was taken to the operating room where spinal anesthesia was found to be adequate. She was then prepped and draped in the usual sterile fashion and placed in the supine position with a leftward tilt. A Pfannenstiel skin incision was made with the scalpel and carried down to the underlying layer of fascia with the bovie. The fascia was incised in the midline and the incision extended laterally with the garza scissors. Superior of the fascial incision was grasped with Kocher clamps and  from the rectus muscles sharply and bluntly. In a similar fashion, the inferior aspect of the fascial incision was grasped with the Kocher clamps and  from the rectus muscles sharply and bluntly. The rectus muscles were  in the midline bluntly and peritoneum entered bluntly. The peritoneal incision was extended manually. Bladder blade was inserted and lower uterine incision made with the scalpel. Incision extended manually laterally. Infants head delivered atraumatically. Nose and mouth suctioned. Cord clamped and cut. Infant handed off to the waiting NICU staff. The uterus was exteriorized and cleared of all clots and debris. Hysterotomy was closed with 0-vicryl in a running, locking fashion. A second layer of 0-vicryl was placed in a interrupted figure of 8 fashion to imbricate the incision. The pelvis and gutters were cleared of all clots and debris, the uterus returned to the abdomen and hemostasis was assured throughout. Intercede was placed over the hysterotomy site.   Fascial incision repaired with 0-PDS in a running fashion. Subcutaneous tissue was cleared of all clots and debris and hemostasis assured. Subcutaneous tissue reapproximated with 3-0 vicryl rapide in a running fashion. Skin closed with 4-0 monocryl in a subcuticular fashion. Pt tolerated procedure well. Sponge, lap and needle counts correct x 3. Disposition: Pt to RR stable and infant to  nursery stable.     Pathology: none      Jackelyn Rm MD  10:16 AM  21

## 2021-03-30 NOTE — PROGRESS NOTES
I have examined and spoken with the patient this morning. She has no new medical issues or complaints. She reports no new medicines since H&P. D/W pt at length risks/benefits of procedure including but not limited to death, bleeding, infection and damage to other internal organs. She exhibited full understanding and wishes to proceed.       Julio Marroquin MD  8:30 AM  03/30/21

## 2021-03-30 NOTE — PROGRESS NOTES
Safety Teaching reviewed:   1. Hand hygiene prior to handling the infant. 2. Use of bulb syringe  3. Bracelets with matching numbers are placed on mother and infant  3. An infant security tag  [facility]) is placed on the infant's ankle and monitored  5. All OB nurses wear pink Employee badges - do not give your baby to anyone without proper identification. 6. Never leave the baby alone in the room. 7. The infant should be placed on their back to sleep. on a firm mattress. No toys should be placed in the crib. (safe sleep video offered to view)  8. Never shake the baby (video offered to view)  9. Infant fall prevention - do not sleep with the baby, and place the baby in the crib while ambulating. 8. Mother and Baby Care booklet given to Mother.

## 2021-03-30 NOTE — ANESTHESIA POSTPROCEDURE EVALUATION
Procedure(s):   SECTION. spinal    Anesthesia Post Evaluation      Multimodal analgesia: multimodal analgesia used between 6 hours prior to anesthesia start to PACU discharge  Patient location during evaluation: floor  Patient participation: complete - patient participated  Level of consciousness: awake  Pain management: satisfactory to patient  Airway patency: patent  Anesthetic complications: no  Cardiovascular status: hemodynamically stable  Respiratory status: spontaneous ventilation  Hydration status: euvolemic  Post anesthesia nausea and vomiting:  none    Unlabored respirations, on room air.      INITIAL Post-op Vital signs:   Vitals Value Taken Time   /64 21 1220   Temp 36.3 °C (97.4 °F) 21 1220   Pulse 63 21 1220   Resp 15 21 1220   SpO2 98 % 21 1220

## 2021-03-31 LAB
BLOOD BANK CMNT PATIENT-IMP: NORMAL
ERYTHROCYTE [DISTWIDTH] IN BLOOD BY AUTOMATED COUNT: 16.8 % (ref 11.9–14.6)
FETAL SCREEN,FMHS: NORMAL
HCT VFR BLD AUTO: 29.5 % (ref 35.8–46.3)
HGB BLD-MCNC: 9.9 G/DL (ref 11.7–15.4)
MCH RBC QN AUTO: 29.5 PG (ref 26.1–32.9)
MCHC RBC AUTO-ENTMCNC: 33.6 G/DL (ref 31.4–35)
MCV RBC AUTO: 87.8 FL (ref 79.6–97.8)
NRBC # BLD: 0 K/UL (ref 0–0.2)
PLATELET # BLD AUTO: 181 K/UL (ref 150–450)
PMV BLD AUTO: 10 FL (ref 9.4–12.3)
RBC # BLD AUTO: 3.36 M/UL (ref 4.05–5.2)
WBC # BLD AUTO: 12 K/UL (ref 4.3–11.1)

## 2021-03-31 PROCEDURE — 74011250637 HC RX REV CODE- 250/637: Performed by: OBSTETRICS & GYNECOLOGY

## 2021-03-31 PROCEDURE — 2709999900 HC NON-CHARGEABLE SUPPLY

## 2021-03-31 PROCEDURE — 74011000250 HC RX REV CODE- 250: Performed by: ANESTHESIOLOGY

## 2021-03-31 PROCEDURE — 74011250636 HC RX REV CODE- 250/636: Performed by: OBSTETRICS & GYNECOLOGY

## 2021-03-31 PROCEDURE — 74011250636 HC RX REV CODE- 250/636: Performed by: ANESTHESIOLOGY

## 2021-03-31 PROCEDURE — 85461 HEMOGLOBIN FETAL: CPT

## 2021-03-31 PROCEDURE — 74011250637 HC RX REV CODE- 250/637: Performed by: ANESTHESIOLOGY

## 2021-03-31 PROCEDURE — 74011000250 HC RX REV CODE- 250: Performed by: OBSTETRICS & GYNECOLOGY

## 2021-03-31 PROCEDURE — 85027 COMPLETE CBC AUTOMATED: CPT

## 2021-03-31 PROCEDURE — 65270000029 HC RM PRIVATE

## 2021-03-31 RX ORDER — DOCUSATE SODIUM 100 MG/1
100 CAPSULE, LIQUID FILLED ORAL 2 TIMES DAILY
Status: DISCONTINUED | OUTPATIENT
Start: 2021-03-31 | End: 2021-04-01 | Stop reason: HOSPADM

## 2021-03-31 RX ORDER — SIMETHICONE 80 MG
80 TABLET,CHEWABLE ORAL
Status: DISCONTINUED | OUTPATIENT
Start: 2021-03-31 | End: 2021-04-01 | Stop reason: HOSPADM

## 2021-03-31 RX ORDER — SODIUM CHLORIDE 0.9 % (FLUSH) 0.9 %
5-40 SYRINGE (ML) INJECTION AS NEEDED
Status: DISCONTINUED | OUTPATIENT
Start: 2021-03-31 | End: 2021-04-01 | Stop reason: HOSPADM

## 2021-03-31 RX ORDER — ZOLPIDEM TARTRATE 5 MG/1
5 TABLET ORAL
Status: DISCONTINUED | OUTPATIENT
Start: 2021-03-31 | End: 2021-04-01 | Stop reason: HOSPADM

## 2021-03-31 RX ORDER — DIPHENHYDRAMINE HCL 25 MG
25 CAPSULE ORAL
Status: DISCONTINUED | OUTPATIENT
Start: 2021-03-31 | End: 2021-04-01 | Stop reason: HOSPADM

## 2021-03-31 RX ORDER — IBUPROFEN 600 MG/1
600 TABLET ORAL EVERY 6 HOURS
Status: DISCONTINUED | OUTPATIENT
Start: 2021-03-31 | End: 2021-04-01 | Stop reason: HOSPADM

## 2021-03-31 RX ORDER — ONDANSETRON 4 MG/1
4 TABLET, ORALLY DISINTEGRATING ORAL
Status: DISCONTINUED | OUTPATIENT
Start: 2021-03-31 | End: 2021-04-01 | Stop reason: HOSPADM

## 2021-03-31 RX ORDER — SODIUM CHLORIDE 0.9 % (FLUSH) 0.9 %
5-40 SYRINGE (ML) INJECTION EVERY 8 HOURS
Status: DISCONTINUED | OUTPATIENT
Start: 2021-03-31 | End: 2021-04-01 | Stop reason: HOSPADM

## 2021-03-31 RX ORDER — PRENATAL VIT 96/IRON FUM/FOLIC 27MG-0.8MG
1 TABLET ORAL DAILY
Status: DISCONTINUED | OUTPATIENT
Start: 2021-03-31 | End: 2021-04-01 | Stop reason: HOSPADM

## 2021-03-31 RX ORDER — OXYCODONE HYDROCHLORIDE 5 MG/1
5-10 TABLET ORAL
Status: DISCONTINUED | OUTPATIENT
Start: 2021-03-31 | End: 2021-03-31

## 2021-03-31 RX ORDER — HYDROCODONE BITARTRATE AND ACETAMINOPHEN 7.5; 325 MG/1; MG/1
1 TABLET ORAL
Status: DISCONTINUED | OUTPATIENT
Start: 2021-03-31 | End: 2021-04-01 | Stop reason: HOSPADM

## 2021-03-31 RX ORDER — MORPHINE SULFATE 10 MG/ML
5 INJECTION, SOLUTION INTRAMUSCULAR; INTRAVENOUS
Status: DISCONTINUED | OUTPATIENT
Start: 2021-03-31 | End: 2021-04-01 | Stop reason: HOSPADM

## 2021-03-31 RX ADMIN — SODIUM CHLORIDE 10 ML: 9 INJECTION, SOLUTION INTRAMUSCULAR; INTRAVENOUS; SUBCUTANEOUS at 03:47

## 2021-03-31 RX ADMIN — ACETAMINOPHEN 1000 MG: 500 TABLET, FILM COATED ORAL at 03:35

## 2021-03-31 RX ADMIN — OXYCODONE 5 MG: 5 TABLET ORAL at 14:10

## 2021-03-31 RX ADMIN — OXYCODONE 5 MG: 5 TABLET ORAL at 13:41

## 2021-03-31 RX ADMIN — KETOROLAC TROMETHAMINE 30 MG: 30 INJECTION, SOLUTION INTRAMUSCULAR at 03:47

## 2021-03-31 RX ADMIN — PROMETHAZINE HYDROCHLORIDE 6.25 MG: 25 INJECTION INTRAMUSCULAR; INTRAVENOUS at 03:47

## 2021-03-31 RX ADMIN — HYDROCODONE BITARTRATE AND ACETAMINOPHEN 1 TABLET: 7.5; 325 TABLET ORAL at 18:33

## 2021-03-31 RX ADMIN — DOCUSATE SODIUM 100 MG: 100 CAPSULE ORAL at 17:59

## 2021-03-31 RX ADMIN — IBUPROFEN 600 MG: 600 TABLET, FILM COATED ORAL at 10:00

## 2021-03-31 RX ADMIN — RHO(D) IMMUNE GLOBULIN (HUMAN) 0.3 MG: 1500 SOLUTION INTRAMUSCULAR at 18:26

## 2021-03-31 RX ADMIN — HYDROCODONE BITARTRATE AND ACETAMINOPHEN 1 TABLET: 7.5; 325 TABLET ORAL at 23:11

## 2021-03-31 RX ADMIN — IBUPROFEN 600 MG: 600 TABLET, FILM COATED ORAL at 17:58

## 2021-03-31 RX ADMIN — IBUPROFEN 600 MG: 600 TABLET, FILM COATED ORAL at 21:59

## 2021-03-31 NOTE — PROGRESS NOTES
Nguyen and SCDs discontinued. I.V. fluids stopped. Patient assisted to restroom with standby assistance. Pericare taught. Patient informed that two measured voids are needed by 0400 to prevent nguyen reinsertion. Patient states that she will continue drinking lots of fluids and will notify of voids. Patient has steady gait. Linens changed. Patient tolerated activity well.

## 2021-03-31 NOTE — PROGRESS NOTES
Progress Note                               Patient: Desmond Fonseca MRN: [id number]  SSN: [id number]    YOB: 1980  Age: 45 y.o. Sex: female      1 Day Post-Op     Subjective:     Patient doing well postpartum without significant complaints. Voiding without difficulty. Patient reports normal lochia. . Breastfeeding: Yes     Objective:     Patient Vitals for the past 18 hrs:   Temp Pulse Resp BP SpO2   21 0649 97.9 °F (36.6 °C) 61 16 103/63 97 %   21 0335 97.8 °F (36.6 °C) 72 16 98/60 96 %   21 2309 98.3 °F (36.8 °C) 68 18 99/60 96 %   21 2010 97.9 °F (36.6 °C) 78 16 116/60 98 %   21 1750 98.4 °F (36.9 °C) 70 18 128/67 98 %       Temp (24hrs), Av °F (36.7 °C), Min:97.4 °F (36.3 °C), Max:98.4 °F (36.9 °C)      Physical Exam:    General:   Patient without distress. Abdomen: Soft, fundus firm at level of umbilicus, nontender   Incision: Clean, dry, and intact without erythema   Lower Extremities: Negative for swelling, cords, or tenderness        Lab/Data Review: All lab results for the last 24 hours reviewed. GBS: No results found for: GRBSEXT, GRBSEXT  Blood Type:   Lab Results   Component Value Date/Time    ABO/Rh(D) B NEGATIVE 2021 07:28 AM        Assessment and Plan:     Patient appears to be having an uncomplicated post- course. Continue routine postoperative care and maternal education.

## 2021-03-31 NOTE — PROGRESS NOTES
Patient only voided 100 ml. Encouraged patient to drink a large volume of fluid to help her in voiding larger volume. Patient agreeable.

## 2021-03-31 NOTE — LACTATION NOTE
This note was copied from a baby's chart. Patient requests formula at this time. Patient states she breast/pumped/formula fed her last baby and plans to do the same with this baby. Asked mom if she would like to syringe or bottle feed, mom prefers a bottle. Formula brought to room with slow flow nipples, infant took no more than 5mls. Pump supplies brought to room and asked if mom was ready to pump, mom states she will call out when ready. No further needs.

## 2021-03-31 NOTE — PROGRESS NOTES
Anesthesiology  Post-op Note    Post-op day 1 s/p  via spinal with neuraxial opioids for post-op pain management. Visit Vitals  BP 98/60 (BP 1 Location: Left arm, BP Patient Position: At rest)   Pulse 72   Temp 36.6 °C (97.8 °F)   Resp 16   Ht 6' (1.829 m)   Wt 120.2 kg (265 lb)   LMP 2020 (Exact Date)   SpO2 96%   Breastfeeding Yes   BMI 35.94 kg/m²     Airway patent, patient appropriately hydrated and appears euvolemic. Patient is Alert and oriented. Pain is well controlled. Pruritus is absent. Nausea is absent. No complaints about back or site of injection. Motor and sensory function has returned to baseline in lower extremities. Patient is satisfied with anesthetic and reports no complications. Continue current orders, then initiate surgeon's orders for pain management 24 hours after . Follow up per surgeon.

## 2021-03-31 NOTE — LACTATION NOTE
This note was copied from a baby's chart. In to see mom and infant for follow up. Mom states baby latching well to right breast but not able to get baby on left side well as nipple is shorter. Mom would like pump set up so can pump that side as needed if baby continues to not feed well on left side. She has also supplemented a few times per choice and gave it back w/ curve tip syringe. Assisted mom w/ trying baby on left breast in football hold but baby not interested in latching, even w/ trying to stimulate nipple first. Baby fed fair on right side for 10 minutes- fair, kind of sleepy. Set up hospital grade pump and reviewed how to use. Mom starting to pump for 15 minutes as finishing up. Gave plan reviewed w/ parents. Mom comfortable w/ her plan.  Will follow up in am.

## 2021-03-31 NOTE — PROGRESS NOTES
Patient request to switch narcotic from oxy to norco.  States she had norco with last pregnancy and it worked well. Telephoned Dr. Tone Cat, orders received.

## 2021-03-31 NOTE — PROGRESS NOTES
Chart reviewed - no needs identified. SW met with patient/ while social distancing w/appropriate PPE. Patient denies any history of postpartum depression/anxiety. Patient given informational packet on  mood & anxiety disorders (resources/education). Family denies any additional needs from  at this time. Family has 's contact information should any needs/questions arise.     BALWINDER Rai  [place]   [phone]

## 2021-04-01 VITALS
BODY MASS INDEX: 35.89 KG/M2 | OXYGEN SATURATION: 96 % | HEART RATE: 71 BPM | HEIGHT: 72 IN | DIASTOLIC BLOOD PRESSURE: 60 MMHG | WEIGHT: 265 LBS | SYSTOLIC BLOOD PRESSURE: 114 MMHG | TEMPERATURE: 97.5 F | RESPIRATION RATE: 16 BRPM

## 2021-04-01 PROCEDURE — 74011250637 HC RX REV CODE- 250/637: Performed by: OBSTETRICS & GYNECOLOGY

## 2021-04-01 RX ORDER — HYDROCODONE BITARTRATE AND ACETAMINOPHEN 7.5; 325 MG/1; MG/1
1 TABLET ORAL
Qty: 20 TAB | Refills: 0 | Status: SHIPPED | OUTPATIENT
Start: 2021-04-01 | End: 2021-04-06

## 2021-04-01 RX ORDER — IBUPROFEN 800 MG/1
800 TABLET ORAL
Qty: 60 TAB | Refills: 2 | Status: SHIPPED | OUTPATIENT
Start: 2021-04-01

## 2021-04-01 RX ADMIN — IBUPROFEN 600 MG: 600 TABLET, FILM COATED ORAL at 10:26

## 2021-04-01 RX ADMIN — HYDROCODONE BITARTRATE AND ACETAMINOPHEN 1 TABLET: 7.5; 325 TABLET ORAL at 04:57

## 2021-04-01 RX ADMIN — LEVOTHYROXINE SODIUM 75 MCG: 0.07 TABLET ORAL at 04:57

## 2021-04-01 RX ADMIN — IBUPROFEN 600 MG: 600 TABLET, FILM COATED ORAL at 04:04

## 2021-04-01 RX ADMIN — HYDROCODONE BITARTRATE AND ACETAMINOPHEN 1 TABLET: 7.5; 325 TABLET ORAL at 10:26

## 2021-04-01 NOTE — LACTATION NOTE
In to see mom and infant prior to discharge to home. Mom stated that infant continues to latch and nurse. Mom also continues to pump and had just expressed 10 ml colostrum. Mom also continues to offer infant follow supplement. Reviewed discharge information and mom stated that she has no questions at this time. Mom and infant are following up with [facility] and will see lactation consultant there.

## 2021-04-01 NOTE — DISCHARGE INSTRUCTIONS

## 2021-04-01 NOTE — PROGRESS NOTES
Patient complains of incisional pain rated 6/10 and achy. Given Norco 7.5-325 mg po. Will continue to monitor the patient.

## 2021-04-02 NOTE — DISCHARGE SUMMARY
Obstetrical Discharge Summary     Name: Sylwia Jaime MRN: [id number]  SSN: [id number]    YOB: 1980  Age: 45 y.o. Sex: female      Allergies: Iodine and Betadine [povidone-iodine]    Admit Date: 3/30/2021    Discharge Date: 2021     Admitting Physician: Kristyn Houston MD     Attending Physician:  No att. providers found     * Admission Diagnoses: Single delivery by  [[id number]]    * Discharge Diagnoses:   Information for the patient's :  Sarah Salas [[id number]]   Delivery of a 7 lb 12.5 oz (3.53 kg) male infant via , Low Transverse on 3/30/2021 at 9:40 AM  by Kristyn Houston. Apgars were 9  and 9 . Additional Diagnoses:   Hospital Problems as of 2021 Date Reviewed: 3/15/2021          Codes Class Noted - Resolved POA    COVID-19 affecting pregnancy in second trimester ICD-10-CM: O98.512, U07.1  ICD-9-CM: 647.63, 079.89  2021 - Present Yes    Overview Addendum 2021 10:13 AM by Drew Mcdermott MD     2021 at [facility]: Covid + 20. C/o symptoms of Fever, body aches, cough, congestion, nausea and diarrhea. Loss of taste and smell. Taste and smell still not back. Can taste sour. 2021 at [facility]: Smell not back yet, most other symptoms resolved. Hypothyroidism affecting pregnancy in third trimester ICD-10-CM: O99.283, E03.9  ICD-9-CM: 648.13, 244.9  10/30/2020 - Present Yes    Overview Addendum 2021 10:08 AM by Jonas Gupta NP     10/27/2020: TSH 4.550, T4 10.7 ( start Synthroid 50 mcg daily)  11/10/2020: Pt doing well since starting Synthroid, fatigue improved greatly on day 4 of treatment.  Recheck thyroid labs   21 FT4 1.05/TSH 2.8  2021 at [facility]: Taking Synthroid 50mcg  2021 at [facility]: Taking Synthroid 50mcg-Labs needed for TSH and FT4  2021: TSH 2.940, Increasing Synthroid to 75mcg    PLAN:  Labs every trimester and serial growth in the 3rd trimester                 * (Principal) Previous  delivery, antepartum ICD-10-CM: O34.219  ICD-9-CM: 654.23  2020 - Present Yes    Overview Addendum 2021  8:25 AM by Romayne Portal, NP     D/W pt risks of , including but not limited to: risk of uterine rupture during labor of approximately 1% with a subsequent 40-50% risk of  neurological injury and or death. D/W pt operative risks of repeat C/S including but not limited to death, bleeding, infection, damage to other internal organs and other risks involving future pregnancies and mode of delivery. Also, D/W pt increase in maternal and fetal morbidity after failed FARHAT. Desires repeat when indicated    C/S scheduled for 3/30/2021 @ 9AM             Multigravida of advanced maternal age in third trimester ICD-10-CM: O09.523  ICD-9-CM: 659.63  2020 - Present Yes    Overview Addendum 3/25/2021  2:17 PM by Romayne Portal, NP     TAYLER 2021 by LMP c/w 10 wk US  6/3/2019 - CF, SMA negative  2020 - NIPT neg x 3, male    10/5/2020 at [facility]:  Normal NT, NB present. Met with genetic counselor today and MD; see her report for full details. [id number] Negative, Male (drawn in OB's). 2020 at [facility]: Normal anatomy and echo; MCI, AC 38%, DARRELL WNL, CL 4.2cm. C/o heart palpitations. Currently being evaluated with holter monitor by cardiology  2020 at [facility]: Good fetal growth; MCI AC 54%, Overall 43%, DVP 6.4.    2021 at [facility]: Good fetal growth; MCI. AC 41%, Overall 48%, DARRELL 20 cm (5.6cm). Dopplers WNL. Feels good, no respiratory symptoms. No sign of COVID infection or affect on placenta. 2021 at [facility]: Appropriate fetal growth; AC 39%, DARRELL 44%, DARRELL 17cm, BPP 8/8. C/o headache for past 5 days. BP ok.     · Sent to office to get FT4, TSH, CBC, CMP, LDH, Uric Acid; also do P/C ratio (if elevated, need to then confirm with 24h urine)   · No follow up MFM-refer back as needed  · Twice weekly testing in OB office-talked to [facility] [phone]    3/4/2021: EFW 67%, AC 59%, DARRELL nl, BPP 8/8, dopplers nl, Transverse  3/25/2021: EFW 76%, AC 75%, DARRELL nl, BPP 8/8, dopplers nl, vertex               History of postpartum hypertension ICD-10-CM: Z86.79, Z87.59  ICD-9-CM: V12.59, V13.29  9/9/2020 - Present Yes    Overview Addendum 2/9/2021  9:09 AM by Yonatan Marroquin RN     9/9/2020: Baseline pre-e labs and 24hr urine. Start  mg at 12-36 weeks  9/16/2020: 24hr urine protein 199 mg  10/5/2020 at [facility]:  Patient with history of postpartum hypertension starting 10 days after delivery and lasting x 5 days. States was on Labetalol x 2 weeks. Has not had BP issues since that time. Baseline p/c done on 9/16 was 199. BP stable today at 110/72. Taking ASA 81 mg x 2 tablets. 11/18/2020 at [facility]:  Taking ASA 81 mg x 2 tablets  12/16/2020 at [facility]: BP stable  2/9/2021 at [facility]: BP stable  Plan lasix 20mg postpartum x 7 days. Rh negative state in antepartum period ICD-10-CM: O26.899, Z67.91  ICD-9-CM: 646.83  3/7/2019 - Present Yes    Overview Addendum 1/15/2021 10:13 AM by Penelope Reed MD     Rhogam at 29 (given 1/15/21) weeks and if indicated             Respiratory system disease complicating pregnancy in third trimester ICD-10-CM: O99.513  ICD-9-CM: 646.83, 519.9  2/27/2019 - Present Yes    Overview Addendum 2/9/2021 10:14 AM by Albina Squires MD     Interstitial Lung Disease  Mgmt per Pulm (Dr. Tristian Bailey)  . .. 2/9/2021 [facility]: [facility]; Will begin twice weekly testing in OB office. · Defer management to Dr. Lavell Gayle in Feb 2021  PFTs in third trimester  · If feel patient needs any treatment, please contact our office to discuss options. Would not withhold treatment due to pregnancy.     See AMA Overview             Maternal rheumatoid arthritis complicating pregnancy [facility]) ICD-10-CM: K43.029, M06.9  ICD-9-CM: 648.70, 714.0  2/27/2019 - Present Yes    Overview Addendum 2/9/2021  9:23 AM by Yonatan Marroquin RN 2020: Stable, no meds, no symptoms, Has followup with rheumatology in 2020  10/5/2020 at [facility]:  Patient with rheumatoid arthritis; being managed by Dr. Oli Posey. Next appt next week. Currently not taking any meds. Reports only symptoms are lung related. 2020 at [facility]: Patient with rheumatoid arthritis and ankylosing spondylitis; being managed by Dr. Oli Posey. Pt feels stable and does not desire immunosuppressive therapy at this time. 2020 at [facility]: stable  2021 at [facility]: stable  2021 at [facility]: Stable no complaints  · Okay to use plaquenil or steroids if needed; feel free to call us to discuss options. · Scheduled to see Dr Chava Ramos 2021                  Lab Results   Component Value Date/Time    ABO/Rh(D) B NEGATIVE 2021 07:28 AM      Immunization History   Administered Date(s) Administered    Influenza Vaccine 10/01/2017    Influenza Vaccine (Quad) PF (>6 Mo Flulaval, Fluarix, and >3 Yrs Evan, Fluzone [id number]) 10/02/2019    Rho(D) Immune Globulin - IM 10/01/2019, 2021    Tdap 10/02/2019       * Procedures: RLTCD  Procedure(s) with comments:   SECTION - charlie 2021 Repeat  Section           * Discharge Condition: good    * Hospital Course: Normal hospital course following the delivery. * Disposition: Home    Discharge Medications:   Discharge Medication List as of 2021 11:27 AM      START taking these medications    Details   HYDROcodone-acetaminophen (NORCO) 7.5-325 mg per tablet Take 1 Tab by mouth every six (6) hours as needed for Pain for up to 5 days.  Max Daily Amount: 4 Tabs., Normal, Disp-20 Tab, R-0      ibuprofen (MOTRIN) 800 mg tablet Take 1 Tab by mouth every six (6) hours as needed for Pain., Normal, Disp-60 Tab, R-2         CONTINUE these medications which have NOT CHANGED    Details   docusate sodium (Colace) 100 mg capsule Take 100 mg by mouth two (2) times a day., Historical Med      calcium carb/mag carb/folic ac (MAGNEBIND 624 PO) Take  by mouth., Historical Med      levothyroxine (SYNTHROID) 75 mcg tablet Take 1 Tab by mouth Daily (before breakfast). , Normal, Disp-30 Tab, R-0      ferrous sulfate 325 mg (65 mg iron) tablet Take 1 Tab by mouth two (2) times a day., Normal, Disp-60 Tab, R-6      diphenhydrAMINE (BenadryL) 25 mg capsule Take 25 mg by mouth every six (6) hours as needed., Historical Med      acetaminophen (Tylenol Extra Strength) 500 mg tablet Take  by mouth every six (6) hours as needed for Pain., Historical Med      lansoprazole (PREVACID) 30 mg capsule TAKE 1 CAPSULE BY MOUTH EVERY DAY BEFORE BREAKFAST, Normal, Disp-30 Cap, R-4      prenatal multivit-ca-min-fe-fa (PRENATAL VITAMIN) tab Take  by mouth., Historical Med             * Follow-up Care/Patient Instructions: Activity: Activity as tolerated, No lifting, Driving, or Strenuous exercise for 6 weeks, No sex for 6 weeks and No driving while on narcotic analgesics  Diet: Regular Diet  Wound Care: Keep wound clean and pat dry after shower    Follow-up Information     Follow up With Specialties Details Why Contact Info    Yoana Williamson MD Internal Medicine   [address] [address]  [phone]      Brown Freeman MD Obstetrics & Gynecology Schedule an appointment as soon as possible for a visit in 2 weeks Patient to schedule appointment [address]  [phone]                        .

## 2021-04-02 NOTE — PROGRESS NOTES
Post-Operative Day Number 2 Progress/Discharge Note    Patient doing well post-op day 2 from  delivery without significant complaints. Pain controlled on current medication. Voiding without difficulty, normal lochia. Vitals:  No data found. Temp (24hrs), Av.5 °F (36.4 °C), Min:97.4 °F (36.3 °C), Max:97.5 °F (36.4 °C)      Vital signs stable, afebrile. Exam:  Patient without distress. Abdomen soft, fundus firm at level of umbilicus, approp tender. Incision dry and                      clean without erythema. Lower extremities are negative for swelling, cords or tenderness. Lab/Data Review: All lab results for the last 24 hours reviewed. Assessment and Plan:  Patient appears to be having uncomplicated post- course. Continue routine post-op care and maternal education. Plan discharge for today with follow up in Dr. Nehal Marshall office in 1-2 weeks.

## 2021-04-05 ENCOUNTER — HOSPITAL ENCOUNTER (EMERGENCY)
Age: 41
Discharge: HOME | End: 2021-04-05
Attending: OBSTETRICS & GYNECOLOGY | Admitting: OBSTETRICS & GYNECOLOGY
Payer: COMMERCIAL

## 2021-04-05 VITALS
HEIGHT: 72 IN | BODY MASS INDEX: 33.86 KG/M2 | DIASTOLIC BLOOD PRESSURE: 63 MMHG | SYSTOLIC BLOOD PRESSURE: 123 MMHG | HEART RATE: 57 BPM | RESPIRATION RATE: 14 BRPM | TEMPERATURE: 97.7 F | WEIGHT: 250 LBS

## 2021-04-05 PROCEDURE — 99283 EMERGENCY DEPT VISIT LOW MDM: CPT

## 2021-04-06 NOTE — H&P
History & Physical    Name: Jessica Barbour MRN: [id number]  SSN: [id number]    YOB: 1980  Age: 45 y.o. Sex: female        Subjective: PP swelling  46 yo P2 presents 6 days PP due to an increase in lower extremity swelling. She reports a mild headache. She denies visual changes or epigastric pain. She has a h/o PP HTN requiring treatment with labetalol following her first delivery. She denies concerns regarding her  incision or breasts.       Estimated Date of Delivery: 21  OB History    Para Term  AB Living   2 2 2 0 0 2   SAB TAB Ectopic Molar Multiple Live Births   0 0 0 0 0 2      # Outcome Date GA Lbr Dima/2nd Weight Sex Delivery Anes PTL Lv   2 Term 21 39w0d  3.53 kg M CS-LTranv Spinal N FROYLAN   1 Term 19 39w1d  4.475 kg M CS-LTranv Spinal N FROYLAN       Past Medical History:   Diagnosis Date    Anemia during pregnancy in third trimester 2019    Arthritis, rheumatoid (HCC)     Bowel obstruction (HCC)     no surgery done    Chronic pain     Chronic respiratory failure with hypoxia ([address] 3/20/2019    Disease of blood and blood forming organ     GERD (gastroesophageal reflux disease)     Insomnia     Lung interstitial disease ([address]     Nuchal translucency of fetus on prenatal ultrasound 10/5/2020    See AMA Overview    Pericardial cyst     Postpartum hypertension 10/14/2019    Rhesus isoimmunization affecting pregnancy     Rh negative    Spondylosis     Subclinical hypothyroidism 10/30/2020     Past Surgical History:   Procedure Laterality Date    HX  SECTION  2019    HX TONSILLECTOMY       Social History     Occupational History    Not on file   Tobacco Use    Smoking status: Never Smoker    Smokeless tobacco: Never Used   Substance and Sexual Activity    Alcohol use: No     Frequency: Never    Drug use: No    Sexual activity: Not Currently     Birth control/protection: None     Family History   Problem Relation Age of Onset    Breast Cancer Mother 61    Uterine Cancer Mother 36    Hypertension Mother     Elevated Lipids Mother     Cancer Father         skin    Arthritis Father     Colon Cancer Neg Hx     Ovarian Cancer Neg Hx        Allergies   Allergen Reactions    Iodine Anaphylaxis    Betadine [Povidone-Iodine] Hives     Prior to Admission medications    Medication Sig Start Date End Date Taking? Authorizing Provider   HYDROcodone-acetaminophen (NORCO) 7.5-325 mg per tablet Take 1 Tab by mouth every six (6) hours as needed for Pain for up to 5 days. Max Daily Amount: 4 Tabs. 4/1/21 4/6/21 Yes Jose Guadalupe Dutton MD   ibuprofen (MOTRIN) 800 mg tablet Take 1 Tab by mouth every six (6) hours as needed for Pain. 4/1/21  Yes Jose Guadalupe Dutton MD   docusate sodium (Colace) 100 mg capsule Take 100 mg by mouth two (2) times a day. Yes Provider, Historical   calcium carb/mag carb/folic ac (MAGNEBIND 283 PO) Take  by mouth. Yes Provider, Historical   levothyroxine (SYNTHROID) 75 mcg tablet Take 1 Tab by mouth Daily (before breakfast). 2/11/21  Yes Sadia Rene NP   ferrous sulfate 325 mg (65 mg iron) tablet Take 1 Tab by mouth two (2) times a day. 2/11/21  Yes Sadia Rene NP   diphenhydrAMINE (BenadryL) 25 mg capsule Take 25 mg by mouth every six (6) hours as needed. Yes Provider, Historical   acetaminophen (Tylenol Extra Strength) 500 mg tablet Take  by mouth every six (6) hours as needed for Pain. Yes Provider, Historical   lansoprazole (PREVACID) 30 mg capsule TAKE 1 CAPSULE BY MOUTH EVERY DAY BEFORE BREAKFAST 5/12/20  Yes Eugene Powell MD   prenatal multivit-ca-min-fe-fa (PRENATAL VITAMIN) tab Take  by mouth. Yes Provider, Historical        Review of Systems: Pertinent items are noted in HPI. 10 point ROS is otherwise positive for abdominal tenderness about the incision site and fatigue PP.      Objective:     Vitals:  Vitals:    04/05/21 2039 04/05/21 2040 04/05/21 2104 04/05/21 2115   BP: 134/74 131/72 123/63   Pulse:  63 65 (!) 57   Resp:  14 14 14   Temp:  97.7 °F (36.5 °C)     Weight: 113.4 kg (250 lb)      Height: 6' (1.829 m)           Physical Exam:  Patient without distress. Abdomen: soft, appropriately tender  Lower Extremities:  - Edema 2+  Neuro: DTR 2+, no clonus    Prenatal Labs:   Lab Results   Component Value Date/Time    ABO/Rh(D) B NEGATIVE 03/30/2021 07:28 AM         Assessment/Plan: 46 yo P2 presented 6 days PP due to BLE swelling. BP wnl.       Active Problems:    Postpartum edema (4/5/2021)         Plan:   Pt reassured  Warning s/s of preeclampsia reviewed  Return for increase in swelling or severe headache or any other concerns  Support hose, ambulation and elevated for BLE edema

## 2021-04-06 NOTE — PROGRESS NOTES
Pt to [facility] for complaints of swelling postpartum  VS Stable  Dr Gonzales Current in to see pt, all questions answered c pt verb understanding  Order to D/C home

## 2021-07-09 NOTE — HPI: SKIN LESIONS
How Severe Is Your Skin Lesion?: mild
TELE ICU WILL NOT BE FOLLOWING  THIS PATIENT ( PEDIATRIC )  
Have Your Skin Lesions Been Treated?: not been treated
Is This A New Presentation, Or A Follow-Up?: Skin Lesions
Which Family Member (Optional)?: Father
Additional History: Patient denies anything new or changing. Patient stated that her father has had melanoma. Patient denies any Personal history of skin cancer.

## 2022-03-19 PROBLEM — Z09 POSTOPERATIVE EXAMINATION: Status: ACTIVE | Noted: 2019-10-14

## 2022-04-30 NOTE — H&P
[place] [address], [address]  [address], MD, Haley Fry, NP-BC  [facility] H&P      Assessment/Plan    Problem List  Date Reviewed: 3/15/2021          Codes Class    Anemia affecting pregnancy in third trimester ICD-10-CM: O99.013  ICD-9-CM: 648.23, 285.9     Overview Signed 2/11/2021 10:22 AM by Russel Marie NP     [employer]             COVID-19 affecting pregnancy in second trimester ICD-10-CM: O98.512, U07.1  ICD-9-CM: 647.63, 079.89     Overview Addendum 2/9/2021 10:13 AM by Albina Squires MD     1/20/2021 at [facility]: Covid + 12/24/20. C/o symptoms of Fever, body aches, cough, congestion, nausea and diarrhea. Loss of taste and smell. Taste and smell still not back. Can taste sour. 2/9/2021 at [facility]: Smell not back yet, most other symptoms resolved. Marginal insertion of umbilical cord affecting management of mother ICD-10-CM: S30.666  ICD-9-CM: 656.73     Overview Addendum 12/17/2020 10:03 AM by Penelope Reed MD     PLAN:  serial growth in 3rd trimester             Heart palpitations ICD-10-CM: R00.2  ICD-9-CM: 785.1     Overview Addendum 1/20/2021 11:01 AM by Yonatan Marroquin RN     12/16/2020 [facility]: s/p holter monitor. No symptoms. Echo 12/29, Cardiology appt 1/3. Holtor wnl  1/20/2021 at [facility]: Denies heart palpitations  · Cardiology echo in 2/1/21             Hypothyroidism affecting pregnancy in third trimester ICD-10-CM: O99.283, E03.9  ICD-9-CM: 648.13, 244.9     Overview Addendum 2/11/2021 10:08 AM by Russel Marie NP     10/27/2020: TSH 4.550, T4 10.7 ( start Synthroid 50 mcg daily)  11/10/2020: Pt doing well since starting Synthroid, fatigue improved greatly on day 4 of treatment.  Recheck thyroid labs   11/24/21 FT4 1.05/TSH 2.8  1/20/2021 at [facility]: Taking Synthroid 50mcg  2/9/2021 at [facility]: Taking Synthroid 50mcg-Labs needed for TSH and FT4  2/11/2021: TSH 2.940, Increasing Synthroid to 75mcg    PLAN:  Labs every trimester and serial growth in the 3rd trimester                 Previous  delivery, antepartum ICD-10-CM: O34.219  ICD-9-CM: 654.23     Overview Addendum 2021  8:25 AM by Melinda Bah NP     D/W pt risks of , including but not limited to: risk of uterine rupture during labor of approximately 1% with a subsequent 40-50% risk of  neurological injury and or death. D/W pt operative risks of repeat C/S including but not limited to death, bleeding, infection, damage to other internal organs and other risks involving future pregnancies and mode of delivery. Also, D/W pt increase in maternal and fetal morbidity after failed FARHAT. Desires repeat when indicated    C/S scheduled for 3/30/2021 @ 9AM             Prior fetal macrosomia, antepartum, third trimester ICD-10-CM: O09.293  ICD-9-CM: V23.49     Overview Addendum 2020 10:02 AM by Kali Sahu MD     G1 9# 13oz - plan routine growth             Multigravida of advanced maternal age in third trimester ICD-10-CM: O09.523  ICD-9-CM: 659.63     Overview Addendum 3/25/2021  2:17 PM by Melinda Bah NP     TAYLER 2021 by LMP c/w 10 wk US  6/3/2019 - CF, SMA negative  2020 - NIPT neg x 3, male    10/5/2020 at [facility]:  Normal NT, NB present. Met with genetic counselor today and MD; see her report for full details. [id number] Negative, Male (drawn in OB's). 2020 at [facility]: Normal anatomy and echo; MCI, AC 38%, DARRELL WNL, CL 4.2cm. C/o heart palpitations. Currently being evaluated with holter monitor by cardiology  2020 at [facility]: Good fetal growth; MCI AC 54%, Overall 43%, DVP 6.4.    2021 at [facility]: Good fetal growth; MCI. AC 41%, Overall 48%, DARRELL 20 cm (5.6cm). Dopplers WNL. Feels good, no respiratory symptoms. No sign of COVID infection or affect on placenta. 2021 at [facility]: Appropriate fetal growth; AC 39%, DARRELL 44%, DARRELL 17cm, BPP 8/8. C/o headache for past 5 days. BP ok. · Sent to office to get FT4, TSH, CBC, CMP, LDH, Uric Acid; also do P/C ratio (if elevated, need to then confirm with 24h urine)   · No follow up MFM-refer back as needed  · Twice weekly testing in OB office-talked to [facility] [phone]    3/4/2021: EFW 67%, AC 59%, DARRELL nl, BPP 8/8, dopplers nl, Transverse  3/25/2021: EFW 76%, AC 75%, DARRELL nl, BPP 8/8, dopplers nl, vertex               History of postpartum hypertension ICD-10-CM: Z86.79, Z87.59  ICD-9-CM: V12.59, V13.29     Overview Addendum 2/9/2021  9:09 AM by Olena Iqbal RN     9/9/2020: Baseline pre-e labs and 24hr urine. Start  mg at 12-36 weeks  9/16/2020: 24hr urine protein 199 mg  10/5/2020 at [facility]:  Patient with history of postpartum hypertension starting 10 days after delivery and lasting x 5 days. States was on Labetalol x 2 weeks. Has not had BP issues since that time. Baseline p/c done on 9/16 was 199. BP stable today at 110/72. Taking ASA 81 mg x 2 tablets. 11/18/2020 at [facility]:  Taking ASA 81 mg x 2 tablets  12/16/2020 at [facility]: BP stable  2/9/2021 at [facility]: BP stable  Plan lasix 20mg postpartum x 7 days. Rh negative state in antepartum period ICD-10-CM: O26.899, Z67.91  ICD-9-CM: 646.83     Overview Addendum 1/15/2021 10:13 AM by Shree Young MD     Rhogam at 29 (given 1/15/21) weeks and if indicated             Respiratory system disease complicating pregnancy in third trimester ICD-10-CM: O99.513  ICD-9-CM: 646.83, 519.9     Overview Addendum 2/9/2021 10:14 AM by Soham Romero MD     Interstitial Lung Disease  Mgmt per Pulm (Dr. Najma Lopez)  . .. 2/9/2021 [facility]: No longer requiring pulm rehab secondary to [place]; Will begin twice weekly testing in OB office. · Defer management to Dr. Crow Patino in Feb 2021  PFTs in third trimester  · If feel patient needs any treatment, please contact our office to discuss options. Would not withhold treatment due to pregnancy.     See AMA Overview             Maternal rheumatoid arthritis complicating pregnancy [facility]) ICD-10-CM: N98.468, M06.9  ICD-9-CM: 648.70, 714.0     Overview Addendum 2021  9:23 AM by Wily Brownlee RN     2020: Stable, no meds, no symptoms, Has followup with rheumatology in 2020  10/5/2020 at [facility]:  Patient with rheumatoid arthritis; being managed by Dr. Beronica Hinojosa. Next appt next week. Currently not taking any meds. Reports only symptoms are lung related. 2020 at [facility]: Patient with rheumatoid arthritis and ankylosing spondylitis; being managed by Dr. Beronica Hinojosa. Pt feels stable and does not desire immunosuppressive therapy at this time. 2020 at [facility]: stable  2021 at [facility]: stable  2021 at [facility]: Stable no complaints  · Okay to use plaquenil or steroids if needed; feel free to call us to discuss options. · Scheduled to see Dr Beronica Hinojosa 2021                   Subjective    Harriet Boyce 45 y.o.  39w0d  presented to L&D for repeat C/S. Pt has no complaints today. Pt denies vaginal bleeding/discharge. No LOF.  +FM.       OB History    Para Term  AB Living   2 1 1 0 0 1   SAB TAB Ectopic Molar Multiple Live Births   0 0 0 0 0 1      # Outcome Date GA Lbr Dima/2nd Weight Sex Delivery Anes PTL Lv   2 Current            1 Term 19 39w1d  9 lb 13.9 oz (4.475 kg) M CS-LTranv Spinal N FROYLAN       Past Medical History:   Diagnosis Date    Anemia during pregnancy in third trimester 2019    Arthritis, rheumatoid (HCC)     Bowel obstruction (HCC)     no surgery done    Chronic pain     Chronic respiratory failure with hypoxia ([address] 3/20/2019    Disease of blood and blood forming organ     GERD (gastroesophageal reflux disease)     Insomnia     Lung interstitial disease ([address]     Nuchal translucency of fetus on prenatal ultrasound 10/5/2020    See AMA Overview    Pericardial cyst     Postpartum hypertension 10/14/2019    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    Spondylosis     Subclinical hypothyroidism 10/30/2020       Past Surgical History:   Procedure Laterality Date    HX  SECTION  2019    HX TONSILLECTOMY         Family History   Problem Relation Age of Onset    Breast Cancer Mother 61    Uterine Cancer Mother 36    Hypertension Mother     Elevated Lipids Mother     Cancer Father         skin    Arthritis Father     Colon Cancer Neg Hx     Ovarian Cancer Neg Hx        Social History     Socioeconomic History    Marital status:      Spouse name: Ronald Hunter Number of children: Not on file    Years of education: Not on file    Highest education level:  Bachelor's degree (e.g., BA, AB, BS)   Occupational History    Not on file   Social Needs    Financial resource strain: Not on file    Food insecurity     Worry: Not on file     Inability: Not on file    Transportation needs     Medical: Not on file     Non-medical: Not on file   Tobacco Use    Smoking status: Never Smoker    Smokeless tobacco: Never Used   Substance and Sexual Activity    Alcohol use: No     Frequency: Never    Drug use: No    Sexual activity: Not Currently     Birth control/protection: None   Lifestyle    Physical activity     Days per week: Not on file     Minutes per session: Not on file    Stress: Not on file   Relationships    Social connections     Talks on phone: Not on file     Gets together: Not on file     Attends Buddhism service: Not on file     Active member of club or organization: Not on file     Attends meetings of clubs or organizations: Not on file     Relationship status: Not on file    Intimate partner violence     Fear of current or ex partner: Not on file     Emotionally abused: Not on file     Physically abused: Not on file     Forced sexual activity: Not on file   Other Topics Concern     Service Not Asked    Blood Transfusions Not Asked    Caffeine Concern No    Occupational Exposure Not Asked   Tristan Taylor Hazards Not Asked    Sleep Concern Not Asked    Stress Concern Not Asked    Weight Concern Not Asked    Special Diet Not Asked    Back Care Not Asked    Exercise No    Bike Helmet Not Asked    Seat Belt Yes    Self-Exams Yes   Social History Narrative    Abuse: Feels safe at home, no history of physical abuse, no history of sexual abuse       Allergies   Allergen Reactions    Iodine Anaphylaxis    Betadine [Povidone-Iodine] Hives         Review of Systems:    Constitutional: No fevers or chills     Prenatal: + fetal movement, no VB/DC, no LOF     CV: No chest pain or palpatations    Resp: No SOB or cough    GI: No nausea/vomiting/diarrhea/constipation    Neuro: No HA, no seizure like activity    Skin: No rashes or lesions     Breast: No breast pain    : No dysuria or hematuria      Prenatal Record Review    The prenatal record has been reviewed.     Prenatal Labs:   No results found for: RUBELLAEXT, GRBSEXT, HBSAGEXT, HIVEXT, RPREXT, GONNOEXT, CHLAMEXT    Objective    Visit Vitals  LMP 06/30/2020 (Exact Date)       Physical Exam    Gen: alert and cooperative, NAD    HEENT: NCAT    CV: RRR    RESP: CTA bilat    ABD: Gravid, soft, NT    EXT: trace edema bilat    NEURO: No focal deficits    SKIN: No noted rashes or lesions       Preethi Torrez MD  9:32 AM  03/26/21 Female

## 2022-10-21 ENCOUNTER — HOSPITAL ENCOUNTER (OUTPATIENT)
Dept: PULMONOLOGY | Age: 42
Discharge: HOME | End: 2022-10-24
Payer: COMMERCIAL

## 2022-10-21 ENCOUNTER — NURSE ONLY (OUTPATIENT)
Dept: PULMONOLOGY | Age: 42
End: 2022-10-21

## 2022-10-21 ENCOUNTER — OFFICE VISIT (OUTPATIENT)
Dept: PULMONOLOGY | Age: 42
End: 2022-10-21
Payer: COMMERCIAL

## 2022-10-21 VITALS
TEMPERATURE: 97.2 F | BODY MASS INDEX: 32.37 KG/M2 | HEART RATE: 101 BPM | DIASTOLIC BLOOD PRESSURE: 59 MMHG | HEIGHT: 72 IN | OXYGEN SATURATION: 100 % | SYSTOLIC BLOOD PRESSURE: 91 MMHG | WEIGHT: 239 LBS

## 2022-10-21 DIAGNOSIS — M35.02 SJOGREN SYNDROME WITH LUNG INVOLVEMENT (HCC): ICD-10-CM

## 2022-10-21 DIAGNOSIS — J84.9 INTERSTITIAL PULMONARY DISEASE, UNSPECIFIED (HCC): ICD-10-CM

## 2022-10-21 DIAGNOSIS — M06.4 INFLAMMATORY POLYARTHROPATHY (HCC): ICD-10-CM

## 2022-10-21 DIAGNOSIS — J84.9 INTERSTITIAL PULMONARY DISEASE, UNSPECIFIED (HCC): Primary | ICD-10-CM

## 2022-10-21 PROCEDURE — 94060 EVALUATION OF WHEEZING: CPT

## 2022-10-21 PROCEDURE — 94726 PLETHYSMOGRAPHY LUNG VOLUMES: CPT

## 2022-10-21 PROCEDURE — 99214 OFFICE O/P EST MOD 30 MIN: CPT | Performed by: INTERNAL MEDICINE

## 2022-10-21 PROCEDURE — 94729 DIFFUSING CAPACITY: CPT

## 2022-10-21 NOTE — PROGRESS NOTES
[address], [address] [facility]  [phone]    6 Minute Walk Test      Patient's Name Eugenio Reamer Sessions Claudene High   Age 40 y.o. Gender female   Height 6'   Weight 239 lbs   Ordering Provider  Mis Colón     Medications taken before test are up to date:  Yes OR NO   Supplemental oxygen during the test:  Yes, @ LPM or NO      Base End    Time 0953 0959 Am    Blood Pressure 98/61 102/80 mmHg   Heart Rate  96 126 bpm   Dyspnea  2 2 Jake Scale   Fatigue 0 0 Jake Scale   O2 Saturation  98 92 RA       Stopped or paused before 6 minutes? NO   Other symptoms at end of exercise:  None     Number of laps: __5___ x 60 meters = ___300___ meters + final partial lap:____0__ meters =  ____0___ meters    Total distance walked in 6 minutes: 300 Meters  Predicted distance: _______567.6____ meters  Percent of predicted distance: __52.85__ %                Tech comments: Tolerated well. Test Performed by: Carla HERRERA, RN        6mw calculator:  (DELETE AFTER USE)  [employer]/6-minute-walk-test

## 2022-10-21 NOTE — PROGRESS NOTES
Patient Name:  Mary Cooper                               YOB: 1980  MRN: [id number]                                                Office Visit 10/21/2022    ASSESSMENT AND PLAN:  (Medical Decision Making)  Clinically seems to be doing remarkably well with regard to her shortness of breath. We plan to obtain complete pulmonary function testing and 6-minute walk today and arrange echo and high-resolution CT in the next 1 to 2 weeks. CellCept seems to have dramatically improved her life. 1. Interstitial pulmonary disease, unspecified ([facility]  Presumably related to ankylosing spondylitis or Sjogrens. Monitor CT. Lung sounds are improved and dyspnea is improved. We will coordinate with Dr. Syed Miller if any further action is required for lung disease.    -     Ambulatory referral to Cardiology  -     CT CHEST HIGH RESOLUTION; Future  -     Full PFT Study With Bronchodilator; Future  2. Inflammatory polyarthropathy ([facility]  Improved symptoms  -     Ambulatory referral to Cardiology  -     Full PFT Study With Bronchodilator; Future  3. Sjogren syndrome with lung involvement ([facility]  Will review CT to see if pattern of ILD more supportive of a specific diagnosis like LIP, NSIP, stc.  -     Ambulatory referral to Cardiology  -     CT CHEST HIGH RESOLUTION; Future  -     Full PFT Study With Bronchodilator; Future     Romayne Gaines, MD    Follow-up and Dispositions    Return in about 6 months (around 4/21/2023). Total time for encounter on day of encounter was 31 minutes. This time includes chart prep, review of tests/procedures, review of other provider's notes, documentation and counseling patient regarding disease process and medications.    ___________________________________________________________________         ______      REASON FOR VISIT:   Chief Complaint   Patient presents with    Follow-up       HISTORY OF PRESENT ILLNESS:    Ms. Mainor Cook is a 40 y.o. female with a PMH of CTD-ILD who is seen at [facility] today for follow up. She is a lifelong non-smoker, who lives in [facility] [facility] after moving from [place]. She has a past medical history of possible ankylosing spondylitis affecting her cervical spine and costochondral joints, possible  rheumatoid arthritis affecting her ankles and fingers, pericardial cyst, mitral valve prolapse, interstitial lung disease, severe acid reflux. Dr. Lissette Garcia has re-evaluated her rheumatologic diagnosis. Her SS Abs were positive, HLA-B27 positive, but rheumatoid studies negative. More recently CTD evaluation has been notable for +RENALDO, +SSA      Started plaquenil 400mg once a day which helps with back pain. Has also started on cellcept 500mg bid twice a day. Recently had laboratory workup with Dr. Pineda Delaney which revealed high titer Ro57 Abs which are associated with ILD. She reports that  cellcept seemed to be improving her shortness of breath until she developed a GI viral syndrome recently seems to have worsened general inflammatory symptoms (pain, dyspnea). No significant cough and oxygenation has been normal.  Last TTE without evidence  of RV change or pulmonary hypertension. She no longer has significant shortness of breath and she was able to walk on an incline improper [place] last weekend. She does notice that she develo reliably develops ped shortness of breath after eating. She denies any heartburn symptoms or coughing. We are supposed to have complete pulmonary function test and high-resolution CT with 6-minute walk test all done prior to today's visit but unfortunately these were not scheduled and are being arranged ASAP. Tobacco Use      Smoking status: Never      Smokeless tobacco: Never  REVIEW OF SYSTEMS:   10 point review of systems is negative except as reported in HPI.     PHYSICAL EXAM:   Vitals:    10/21/22 0815   BP: (!) 91/59   Pulse: (!) 101   Temp: 97.2 °F (36.2 °C)   TempSrc: Skin   SpO2: 100%   Weight: 239 lb (108.4 kg)   Height: 6' (1.829 m)     Body mass index is 32.41 kg/m². General:   Alert, cooperative, no distress, appears stated age. Eyes/Ears/Nose:   Conjunctivae/corneas clear. PERRL. Nasal mucosa is normal.  Normal TMs and external auditory canals. Mouth/Throat:  Lips, mucosa, and tongue normal. Teeth and gums normal.        Lungs:     NO crackles today. Heart:   Regular rate and rhythm, S1, S2 normal, no murmur, click, rub or gallop. Abdomen:    Soft, non-tender. Extremities:  Extremities normal, atraumatic, no cyanosis or edema. Skin:  Skin color normal. No rashes or lesions     Neurologic:  A&Ox3     DIAGNOSTIC TESTS:                                                                                                                        Spirometry:  Severe restriction          03.13.2019 5/13/2019 (pregnant)   1/26/2021   (30wks pregnant)   FVC  1.70-37%   1.77 (41%)   1.46 (32%)   FEV1  1.46-39%   1.59 (44%)   1.31 (36%)          FEV1/FVC  0.86   0.90   0.90   TLC     3.19 (53%)     FRC     1.94 (58%)     RV     1.41 (77%)     DLCO     44%             CT 6/5/2020   Images through the lungs demonstrate progressive chronic interstitial fibrotic   changes. There are several stable nodules, in the right lung apex (series 2 image 12) measuring 5 mm, in the superior right lower lobe (series 2 image 23)  measuring 4.5 mm, in the left lower lobe (series 2 image 30, and 31) measuring 4  3 mm respectively, and in the right lateral costophrenic sulcus (series 2 image 41) measuring 5 mm. There is a new groundglass nodular density along the   superior margin of the left oblique fissure measuring 5.2 x 10.4 cm (2 image   20). No effusions are identified. Normal vasculature is demonstrated allowing for noncontrast examination. No   evidence of aortic aneurysmal dilatation is seen.        The heart and mediastinum are grossly unremarkable. There is a persistent   prominent right pericardial cyst measuring 3.8 x 6.1 cm No significant hilar or   mediastinal lymphadenopathy is demonstrated. There are small subcentimeter   thyroid nodules. Exercise oximetry:         AMBULATING OXIMETRY: 1/26/21  O2 sat  HR     Room air at Rest  95%  110 bpm     Room air ambulating  87 %   135bpm         (recovery) Ambulating on 1  Lpm  92 %   108bpm              TTE   2/1/2021        LV  EF 60 to 69%   Diastolic function normal        RV   normal size and function        Peak TRV          RVSP   not elevated        COMMENTS   normal valves             Pulmonary Function Testing:   Date    10/28/2020        FVC    1.81 (40%)        FEV1    1.15 (41%)        FEV1/FVC    0.83        FEF 25-75%     2.14 (58%)        TLC    2.54 (41%)        FRC    1.65 (47%)        RV    0.72 (37%)        DLCO    14.0 (43%)        No flowsheet data found. No results found for this or any previous visit. No results found for this or any previous visit. LABS:   Lab Results   Component Value Date/Time    WBC 12.0 03/31/2021 05:48 AM    HGB 9.9 03/31/2021 05:48 AM    HCT 29.5 03/31/2021 05:48 AM     03/31/2021 05:48 AM    TSH 2.940 02/09/2021 10:00 AM     Imaging: I performed an independent interpretation of the patient's images. CXR: No results found for this or any previous visit from the past 3650 days. CT Chest:   CT CHEST WO CONTRAST 06/05/2020    Narrative  Noncontrast CT of the chest    Clinical History: The patient is a 46years year old Female presenting for  follow-up of pulmonary nodules Technique: Thin section axial images were obtained through the chest without intravenous  contrast.  Coronal reformatted images were also provided for review. All CT scans at this facility are performed using dose reduction/dose modulation  techniques, as appropriate the performed exam, including the following:  Automated Exposure Control;  Adjustment of the mA and/or kV according to patient  size (this includes techniques or standardized protocols for targeted exams  where dose is matched to indication/reason for exam); and Use of Iterative  Reconstruction Technique. Total radiation dose: 561 mGy-cm    Comparison:  Outside chest CT 7/29/2010. Findings:  Images through the lungs demonstrate progressive chronic interstitial fibrotic  changes. There are several stable nodules, in the right lung apex (series 2  image 12) measuring 5 mm, in the superior right lower lobe (series 2 image 23)  measuring 4.5 mm, in the left lower lobe (series 2 image 30, and 31) measuring 4  and 3 mm respectively, and in the right lateral costophrenic sulcus (series 2  image 41) measuring 5 mm. There is a new groundglass nodular density along the  superior margin of the left oblique fissure measuring 5.2 x 10.4 cm (2 image  20). No effusions are identified. Normal vasculature is demonstrated allowing for noncontrast examination. No  evidence of aortic aneurysmal dilatation is seen. The heart and mediastinum are grossly unremarkable. There is a persistent  prominent right pericardial cyst measuring 3.8 x 6.1 cm No significant hilar or  mediastinal lymphadenopathy is demonstrated. There are small subcentimeter  thyroid nodules. Images chest wall structures as well as visualized portions of the upper abdomen  are unremarkable in appearance. There are no acute osseous abnormalities. Impression  Impression:    1. Predominantly stable small pulmonary nodules scattered throughout the lungs  consistent with a benign etiology. 2. Interval developing area of focal groundglass nodularity in the superior  margin of the left oblique fissure likely related progressive interstitial lung  disease although short-term follow-up is recommended to assess for stability,  with repeat CT in 6 months. 3. Progressive interstitial pulmonary fibrotic changes.   4. Incidental right pericardial cyst.  5. Small thyroid nodules. No results found for this or any previous visit.        REFERENCE INFO:                                                                                                                            Past Medical History:   Diagnosis Date    Anemia during pregnancy in third trimester 7/17/2019    Arthritis, rheumatoid (HCC)     Bowel obstruction (HCC)     no surgery done    Chronic pain     Chronic respiratory failure with hypoxia ([facility] 3/20/2019    Disease of blood and blood forming organ     GERD (gastroesophageal reflux disease)     Insomnia     Lung interstitial disease (HCC)     Nuchal translucency of fetus on prenatal ultrasound 10/5/2020    See AMA Overview    Pericardial cyst     Postpartum hypertension 10/14/2019    Rhesus isoimmunization affecting pregnancy     Rh negative    Spondylosis     Subclinical hypothyroidism 10/30/2020     Allergies   Allergen Reactions    Iodine Anaphylaxis    Povidone-Iodine Hives     Current Outpatient Medications   Medication Instructions    docusate (COLACE, DULCOLAX) 100 mg, 2 TIMES DAILY    esomeprazole (NEXIUM) 40 mg, Oral, DAILY    hydroxychloroquine (PLAQUENIL) 400 mg, Oral, DAILY    ibuprofen (ADVIL;MOTRIN) 800 mg, EVERY 6 HOURS PRN    lansoprazole (PREVACID) 30 MG delayed release capsule TAKE 1 CAPSULE BY MOUTH EVERY DAY BEFORE BREAKFAST    levothyroxine (SYNTHROID) 88 mcg, Oral, DAILY BEFORE BREAKFAST    mycophenolate (CELLCEPT) 500 mg, Oral, 2 TIMES DAILY    zolpidem (AMBIEN) 5 mg, Oral

## 2022-10-28 ENCOUNTER — HOSPITAL ENCOUNTER (OUTPATIENT)
Dept: CT IMAGING | Age: 42
Discharge: HOME | End: 2022-10-30
Payer: COMMERCIAL

## 2022-10-28 DIAGNOSIS — J84.9 INTERSTITIAL PULMONARY DISEASE, UNSPECIFIED (HCC): ICD-10-CM

## 2022-10-28 DIAGNOSIS — M35.02 SJOGREN SYNDROME WITH LUNG INVOLVEMENT (HCC): ICD-10-CM

## 2022-10-28 PROCEDURE — 71250 CT THORAX DX C-: CPT

## 2022-11-01 ENCOUNTER — TELEPHONE (OUTPATIENT)
Dept: PULMONOLOGY | Age: 42
End: 2022-11-01

## 2022-11-01 NOTE — TELEPHONE ENCOUNTER
Message  Received: 4 days ago  MD Angel Irby MA  Please let patient know that there are no major changes and interstitial lung disease remains about the same. Beth Watts MD            1st attempt to call pt to give her  CT results. No answer.

## 2022-12-29 ENCOUNTER — TELEPHONE (OUTPATIENT)
Dept: PULMONOLOGY | Age: 42
End: 2022-12-29

## 2022-12-29 NOTE — TELEPHONE ENCOUNTER
TRIAGE CALL      Complaint: 11 days post flu A   Cough: yes  Productive:  yes green  Bloody Sputum:  no  Increased SOB/Wheezing:  yes both  Duration: 14 days    Fever/Chills:   OTC Meds tried: musinex    Patient feels she may need an antibiotic, she is taking steroids

## 2022-12-30 RX ORDER — OSELTAMIVIR PHOSPHATE 75 MG/1
75 CAPSULE ORAL 2 TIMES DAILY
Qty: 10 CAPSULE | Refills: 0 | Status: SHIPPED | OUTPATIENT
Start: 2022-12-30 | End: 2023-01-04

## 2022-12-30 RX ORDER — AMOXICILLIN AND CLAVULANATE POTASSIUM 875; 125 MG/1; MG/1
1 TABLET, FILM COATED ORAL 2 TIMES DAILY
Qty: 14 TABLET | Refills: 0 | Status: SHIPPED | OUTPATIENT
Start: 2022-12-30 | End: 2023-01-06

## 2022-12-30 NOTE — TELEPHONE ENCOUNTER
Direct conversation and chart review with Dr Deni Carmen who states that pt can have Tamiflu and Augmentin for 7 days. Reviewed with pt who verbalizes understanding. States that she was able to see her PCP who ordered a CXR because she thinks she has PNA. PCP wants to treat with Levaquin and steroids. Explained to pt that Tamilfu and Augmentin will be sent in but not to take unless approved. 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

## 2023-06-04 ENCOUNTER — APPOINTMENT (OUTPATIENT)
Dept: CT IMAGING | Age: 43
End: 2023-06-04
Payer: COMMERCIAL

## 2023-06-04 ENCOUNTER — HOSPITAL ENCOUNTER (EMERGENCY)
Age: 43
Discharge: HOME | End: 2023-06-04
Attending: STUDENT IN AN ORGANIZED HEALTH CARE EDUCATION/TRAINING PROGRAM
Payer: COMMERCIAL

## 2023-06-04 VITALS
RESPIRATION RATE: 17 BRPM | OXYGEN SATURATION: 99 % | TEMPERATURE: 97.7 F | WEIGHT: 200 LBS | BODY MASS INDEX: 28.63 KG/M2 | HEIGHT: 70 IN | SYSTOLIC BLOOD PRESSURE: 104 MMHG | HEART RATE: 93 BPM | DIASTOLIC BLOOD PRESSURE: 61 MMHG

## 2023-06-04 DIAGNOSIS — R10.13 DYSPEPSIA: Primary | ICD-10-CM

## 2023-06-04 LAB
ALBUMIN SERPL-MCNC: 4.3 G/DL (ref 3.5–5)
ALBUMIN/GLOB SERPL: 2 (ref 0.4–1.6)
ALP SERPL-CCNC: 39 U/L (ref 45–117)
ALT SERPL-CCNC: 10 U/L (ref 13–61)
ANION GAP SERPL CALC-SCNC: 10 MMOL/L (ref 2–11)
APPEARANCE UR: ABNORMAL
AST SERPL-CCNC: 13 U/L (ref 15–37)
BACTERIA URNS QL MICRO: ABNORMAL /HPF
BASOPHILS # BLD: 0 K/UL (ref 0–0.2)
BASOPHILS NFR BLD: 0 % (ref 0–2)
BILIRUB SERPL-MCNC: 1 MG/DL (ref 0.2–1.1)
BILIRUB UR QL: NEGATIVE
BUN SERPL-MCNC: 11 MG/DL (ref 7–18)
CALCIUM SERPL-MCNC: 9.2 MG/DL (ref 8.3–10.4)
CASTS URNS QL MICRO: 0 /LPF
CHLORIDE SERPL-SCNC: 105 MMOL/L (ref 98–107)
CO2 SERPL-SCNC: 25 MMOL/L (ref 21–32)
COLOR UR: YELLOW
CREAT SERPL-MCNC: 0.53 MG/DL (ref 0.6–1)
CRYSTALS URNS QL MICRO: 0 /LPF
DIFFERENTIAL METHOD BLD: ABNORMAL
EOSINOPHIL # BLD: 0.1 K/UL (ref 0–0.8)
EOSINOPHIL NFR BLD: 1 % (ref 0.5–7.8)
EPI CELLS #/AREA URNS HPF: ABNORMAL /HPF
ERYTHROCYTE [DISTWIDTH] IN BLOOD BY AUTOMATED COUNT: 14.4 % (ref 11.9–14.6)
GLOBULIN SER CALC-MCNC: 2.2 G/DL (ref 2.8–4.5)
GLUCOSE SERPL-MCNC: 108 MG/DL (ref 65–100)
GLUCOSE UR STRIP.AUTO-MCNC: NEGATIVE MG/DL
HCT VFR BLD AUTO: 33.9 % (ref 35.8–46.3)
HGB BLD-MCNC: 11.3 G/DL (ref 11.7–15.4)
HGB UR QL STRIP: ABNORMAL
IMM GRANULOCYTES # BLD AUTO: 0 K/UL (ref 0–0.5)
IMM GRANULOCYTES NFR BLD AUTO: 0 % (ref 0–5)
KETONES UR QL STRIP.AUTO: NEGATIVE MG/DL
LACTATE SERPL-SCNC: 0.8 MMOL/L (ref 0.4–2)
LEUKOCYTE ESTERASE UR QL STRIP.AUTO: NEGATIVE
LIPASE SERPL-CCNC: 27 U/L (ref 13–60)
LYMPHOCYTES # BLD: 1.8 K/UL (ref 0.5–4.6)
LYMPHOCYTES NFR BLD: 26 % (ref 13–44)
MAGNESIUM SERPL-MCNC: 1.8 MG/DL (ref 1.2–2.6)
MCH RBC QN AUTO: 26.6 PG (ref 26.1–32.9)
MCHC RBC AUTO-ENTMCNC: 33.3 G/DL (ref 31.4–35)
MCV RBC AUTO: 79.8 FL (ref 82–102)
MONOCYTES # BLD: 0.7 K/UL (ref 0.1–1.3)
MONOCYTES NFR BLD: 10 % (ref 4–12)
MUCOUS THREADS URNS QL MICRO: ABNORMAL /LPF
NEUTS SEG # BLD: 4.3 K/UL (ref 1.7–8.2)
NEUTS SEG NFR BLD: 63 % (ref 43–78)
NITRITE UR QL STRIP.AUTO: NEGATIVE
NRBC # BLD: 0 K/UL (ref 0–0.2)
OTHER OBSERVATIONS: ABNORMAL
PH UR STRIP: 6.5 (ref 5–9)
PLATELET # BLD AUTO: 196 K/UL (ref 150–450)
PMV BLD AUTO: 10.9 FL (ref 9.4–12.3)
POTASSIUM SERPL-SCNC: 3.9 MMOL/L (ref 3.5–5.1)
PROCALCITONIN SERPL-MCNC: 0.09 NG/ML (ref 0–0.49)
PROT SERPL-MCNC: 6.5 G/DL (ref 6.4–8.2)
PROT UR STRIP-MCNC: ABNORMAL MG/DL
RBC # BLD AUTO: 4.25 M/UL (ref 4.05–5.2)
RBC #/AREA URNS HPF: ABNORMAL /HPF
SODIUM SERPL-SCNC: 140 MMOL/L (ref 133–143)
SP GR UR REFRACTOMETRY: >=1.03 (ref 1–1.02)
UROBILINOGEN UR QL STRIP.AUTO: 0.2 EU/DL (ref 0.2–1)
WBC # BLD AUTO: 6.8 K/UL (ref 4.3–11.1)
WBC URNS QL MICRO: ABNORMAL /HPF

## 2023-06-04 PROCEDURE — 83735 ASSAY OF MAGNESIUM: CPT

## 2023-06-04 PROCEDURE — 6360000004 HC RX CONTRAST MEDICATION: Performed by: STUDENT IN AN ORGANIZED HEALTH CARE EDUCATION/TRAINING PROGRAM

## 2023-06-04 PROCEDURE — 80053 COMPREHEN METABOLIC PANEL: CPT

## 2023-06-04 PROCEDURE — 96374 THER/PROPH/DIAG INJ IV PUSH: CPT

## 2023-06-04 PROCEDURE — 81001 URINALYSIS AUTO W/SCOPE: CPT

## 2023-06-04 PROCEDURE — 74176 CT ABD & PELVIS W/O CONTRAST: CPT

## 2023-06-04 PROCEDURE — 85025 COMPLETE CBC W/AUTO DIFF WBC: CPT

## 2023-06-04 PROCEDURE — 84145 PROCALCITONIN (PCT): CPT

## 2023-06-04 PROCEDURE — 2580000003 HC RX 258: Performed by: STUDENT IN AN ORGANIZED HEALTH CARE EDUCATION/TRAINING PROGRAM

## 2023-06-04 PROCEDURE — 83605 ASSAY OF LACTIC ACID: CPT

## 2023-06-04 PROCEDURE — 83690 ASSAY OF LIPASE: CPT

## 2023-06-04 PROCEDURE — 6360000002 HC RX W HCPCS: Performed by: STUDENT IN AN ORGANIZED HEALTH CARE EDUCATION/TRAINING PROGRAM

## 2023-06-04 PROCEDURE — 96375 TX/PRO/DX INJ NEW DRUG ADDON: CPT

## 2023-06-04 PROCEDURE — 99284 EMERGENCY DEPT VISIT MOD MDM: CPT

## 2023-06-04 RX ORDER — ONDANSETRON 2 MG/ML
4 INJECTION INTRAMUSCULAR; INTRAVENOUS
Status: COMPLETED | OUTPATIENT
Start: 2023-06-04 | End: 2023-06-04

## 2023-06-04 RX ORDER — AMOXICILLIN AND CLAVULANATE POTASSIUM 875; 125 MG/1; MG/1
1 TABLET, FILM COATED ORAL 2 TIMES DAILY
Qty: 20 TABLET | Refills: 0 | Status: SHIPPED | OUTPATIENT
Start: 2023-06-04 | End: 2023-06-14

## 2023-06-04 RX ORDER — HYDROCODONE BITARTRATE AND ACETAMINOPHEN 5; 325 MG/1; MG/1
1 TABLET ORAL EVERY 6 HOURS PRN
Qty: 8 TABLET | Refills: 0 | Status: SHIPPED | OUTPATIENT
Start: 2023-06-04 | End: 2023-06-09

## 2023-06-04 RX ORDER — KETOROLAC TROMETHAMINE 30 MG/ML
15 INJECTION, SOLUTION INTRAMUSCULAR; INTRAVENOUS EVERY 6 HOURS PRN
Status: DISCONTINUED | OUTPATIENT
Start: 2023-06-04 | End: 2023-06-04 | Stop reason: HOSPADM

## 2023-06-04 RX ORDER — 0.9 % SODIUM CHLORIDE 0.9 %
1000 INTRAVENOUS SOLUTION INTRAVENOUS
Status: COMPLETED | OUTPATIENT
Start: 2023-06-04 | End: 2023-06-04

## 2023-06-04 RX ORDER — HYOSCYAMINE SULFATE 0.12 MG/1
1 TABLET SUBLINGUAL 3 TIMES DAILY PRN
Qty: 60 EACH | Refills: 2 | Status: SHIPPED | OUTPATIENT
Start: 2023-06-04

## 2023-06-04 RX ORDER — FLUCONAZOLE 150 MG/1
150 TABLET ORAL ONCE
Qty: 1 TABLET | Refills: 0 | Status: SHIPPED | OUTPATIENT
Start: 2023-06-04 | End: 2023-06-04

## 2023-06-04 RX ADMIN — ONDANSETRON 4 MG: 2 INJECTION INTRAMUSCULAR; INTRAVENOUS at 07:51

## 2023-06-04 RX ADMIN — SODIUM CHLORIDE 1000 ML: 9 INJECTION, SOLUTION INTRAVENOUS at 07:52

## 2023-06-04 RX ADMIN — DIATRIZOATE MEGLUMINE AND DIATRIZOATE SODIUM 20 ML: 660; 100 LIQUID ORAL; RECTAL at 07:51

## 2023-06-04 RX ADMIN — KETOROLAC TROMETHAMINE 15 MG: 30 INJECTION, SOLUTION INTRAMUSCULAR; INTRAVENOUS at 08:01

## 2023-06-04 ASSESSMENT — PAIN DESCRIPTION - LOCATION
LOCATION: ABDOMEN
LOCATION: ABDOMEN

## 2023-06-04 ASSESSMENT — PAIN SCALES - GENERAL: PAINLEVEL_OUTOF10: 5

## 2023-06-04 ASSESSMENT — PAIN DESCRIPTION - DESCRIPTORS: DESCRIPTORS: ACHING

## 2023-06-04 ASSESSMENT — ENCOUNTER SYMPTOMS
ABDOMINAL PAIN: 1
ABDOMINAL DISTENTION: 1
NAUSEA: 1

## 2023-06-04 ASSESSMENT — PAIN DESCRIPTION - FREQUENCY: FREQUENCY: INTERMITTENT

## 2023-06-04 ASSESSMENT — PAIN DESCRIPTION - ORIENTATION: ORIENTATION: MID

## 2023-06-04 ASSESSMENT — PAIN - FUNCTIONAL ASSESSMENT
PAIN_FUNCTIONAL_ASSESSMENT: 0-10
PAIN_FUNCTIONAL_ASSESSMENT: ACTIVITIES ARE NOT PREVENTED

## 2024-03-04 ENCOUNTER — OFFICE VISIT (OUTPATIENT)
Dept: OBGYN CLINIC | Age: 44
End: 2024-03-04
Payer: COMMERCIAL

## 2024-03-04 VITALS
BODY MASS INDEX: 33.5 KG/M2 | WEIGHT: 234 LBS | HEIGHT: 70 IN | SYSTOLIC BLOOD PRESSURE: 106 MMHG | DIASTOLIC BLOOD PRESSURE: 70 MMHG

## 2024-03-04 DIAGNOSIS — N92.6 IRREGULAR MENSES: Primary | ICD-10-CM

## 2024-03-04 DIAGNOSIS — N89.8 VAGINAL DISCHARGE: ICD-10-CM

## 2024-03-04 DIAGNOSIS — Z11.3 SCREEN FOR STD (SEXUALLY TRANSMITTED DISEASE): ICD-10-CM

## 2024-03-04 LAB
HCG, PREGNANCY, URINE, POC: NEGATIVE
VALID INTERNAL CONTROL, POC: YES

## 2024-03-04 PROCEDURE — 81025 URINE PREGNANCY TEST: CPT | Performed by: NURSE PRACTITIONER

## 2024-03-04 PROCEDURE — 99213 OFFICE O/P EST LOW 20 MIN: CPT | Performed by: NURSE PRACTITIONER

## 2024-03-04 RX ORDER — MEDROXYPROGESTERONE ACETATE 10 MG/1
10 TABLET ORAL DAILY
Qty: 30 TABLET | Refills: 1 | Status: SHIPPED | OUTPATIENT
Start: 2024-03-04

## 2024-03-04 ASSESSMENT — PATIENT HEALTH QUESTIONNAIRE - PHQ9
SUM OF ALL RESPONSES TO PHQ QUESTIONS 1-9: 0
SUM OF ALL RESPONSES TO PHQ QUESTIONS 1-9: 0
SUM OF ALL RESPONSES TO PHQ9 QUESTIONS 1 & 2: 0
1. LITTLE INTEREST OR PLEASURE IN DOING THINGS: 0
SUM OF ALL RESPONSES TO PHQ QUESTIONS 1-9: 0
SUM OF ALL RESPONSES TO PHQ QUESTIONS 1-9: 0
2. FEELING DOWN, DEPRESSED OR HOPELESS: 0

## 2024-03-04 NOTE — ASSESSMENT & PLAN NOTE
UPT neg  GC collected  Diff dx: recent viral illness, thyroid medication change  F/u for pelvic US  Start provera 10 mg daily until next visit

## 2024-03-04 NOTE — PROGRESS NOTES
Chaperone for Intimate Exam     Chaperone was offer accepted as part of the rooming process    Chaperone: Estela UERNA CMA     
Pt comes in today for irregular bleeding. Pt states this has been going on for about a month and a half. Pt states she will have a period and then go 2 weeks with no bleeding and then after that a period starts again.   Increased dosage of Synthroid about a month ago and was recently on steroids for upper respiratory infection 2 weeks ago.   Before most recent period she had bleeding on 02/08/2024-02/15/2024. Then 01/25/2024-01/31/2024     LAST PAP:  09/09/2020 negative negative     LAST MAMMO:  never     LMP:  Patient's last menstrual period was 02/29/2024 (exact date).    BIRTH CONTROL:  vasectomy    TOBACCO USE:  No    FAMILY HISTORY OF:   Breast Cancer:  Yes   Ovarian Cancer:  No   Uterine Cancer:  Yes   Colon Cancer:  No    Vitals:    03/04/24 1409   BP: 106/70   Site: Left Upper Arm   Position: Sitting   Weight: 106.1 kg (234 lb)   Height: 1.778 m (5' 10\")        Estela Gonzalez MA  03/04/24  2:21 PM    
3/4/2024) 60 each 2    lansoprazole (PREVACID) 30 MG delayed release capsule TAKE 1 CAPSULE BY MOUTH EVERY DAY BEFORE BREAKFAST (Patient not taking: Reported on 10/21/2022)      [DISCONTINUED] ibuprofen (ADVIL;MOTRIN) 800 MG tablet Take 800 mg by mouth every 6 hours as needed (Patient not taking: Reported on 10/21/2022)       No facility-administered encounter medications on file as of 3/4/2024.               RANJIT Arora - CNP 03/04/24 2:47 PM

## 2024-03-08 ENCOUNTER — TELEPHONE (OUTPATIENT)
Dept: OBGYN CLINIC | Age: 44
End: 2024-03-08

## 2024-03-08 DIAGNOSIS — N92.6 IRREGULAR MENSES: Primary | ICD-10-CM

## 2024-03-08 LAB
C TRACH RRNA SPEC QL NAA+PROBE: NEGATIVE
N GONORRHOEA RRNA SPEC QL NAA+PROBE: NEGATIVE
SPECIMEN SOURCE: NORMAL
T VAGINALIS RRNA SPEC QL NAA+PROBE: NEGATIVE

## 2024-03-08 NOTE — TELEPHONE ENCOUNTER
Delicia,     Patient called requesting if there have been any cancellations for her to be able to move up her f/u with US visit that is currently scheduled for 3/20/2024 due to her moving to [place] tomorrow and won't be back until the end of April.    Stated to patient that there are no cancellations at this moment and that if she needs an order slip/script for her to get an US else where, that I will need to speak to the provider first for their recommendations.     Patient verbalized understanding but stated mild frustration due to her AUB.   Patient confirms when asked that her bleeding is worse today and she is soaking though a pad and more in an hour or less even with the Provera which she has been taking for 5 days.     Strongly recommended and urged patient to go to the ER to be evaluated for her increased AUB and stated to patient that the ER would be able to do an US and blood work there as well.     Patient stated hesitation to go to the ER, re-encouraged patient to go to the ER.     Patient verbalized understanding.     Recommendations?

## 2024-03-09 NOTE — TELEPHONE ENCOUNTER
The patient is currently in [place] as of today?    If so, yes I do recommend she go to ER for evaluation if soaking a pad every hour.    Is she taking the Provera 10 mg daily. If so, ok to increase to 20 mg daily.    Also we can fax a referral for pelvic US and labs to radiology center of her choice and lab of her choice.     If she still is in town she can come in on Tuesday 3/12 US at 200 and me 330, just let her know there may be a little wait between US and visit

## 2024-03-11 RX ORDER — MEDROXYPROGESTERONE ACETATE 10 MG/1
20 TABLET ORAL DAILY
Qty: 60 TABLET | Refills: 3 | Status: SHIPPED | OUTPATIENT
Start: 2024-03-11

## 2024-03-11 NOTE — TELEPHONE ENCOUNTER
Called patient, patient confirms already being in [place] and that her menses has slowed down and is changing her pads every 3 hours. Patient confirms she is still taking Provera 10mg and verbalizes wanting to increase to 20mg and to send her RX to the [facility] on [address].     Patient will either call or mychart message with her choice of facility for pelvic US and labs.     Sent mychart message to patient re-capping phone call.

## 2024-03-18 ENCOUNTER — TELEPHONE (OUTPATIENT)
Dept: OBGYN CLINIC | Age: 44
End: 2024-03-18

## 2024-03-18 NOTE — TELEPHONE ENCOUNTER
Pt lvm stating she needs a TV US and the order can be faxed to [facility] and the fax number is [phone]. Called pt back to confirm, no answer, NIYAH.

## 2024-03-20 ENCOUNTER — TELEPHONE (OUTPATIENT)
Dept: OBGYN CLINIC | Age: 44
End: 2024-03-20

## 2024-03-20 NOTE — TELEPHONE ENCOUNTER
Pt lvm returning a call. Pt is needing her TV US order sent to [place]. Called pt back to confirm fax number. Pt stated fax number is [phone]. Informed pt I will fax it over. Pt stated it is ok if there are demographics on the order because they already have her information.     Order faxed

## 2024-04-04 ENCOUNTER — TELEPHONE (OUTPATIENT)
Dept: OBGYN CLINIC | Age: 44
End: 2024-04-04

## 2024-04-04 DIAGNOSIS — N92.6 IRREGULAR MENSES: ICD-10-CM

## 2024-04-04 NOTE — TELEPHONE ENCOUNTER
Delicia,    Patient sent the message below regarding her labs:    Labs  Yes I am on Ferritn supplement and upped my Synthroid to 100mcg, starting in January per my PCP. I don’t have a copy of ultrasound report. I will try and make a [facility] hospital account and try and find it. They said it should be sent over to you all, but I will call today to find out. Thank you!

## 2024-04-04 NOTE — TELEPHONE ENCOUNTER
Please let pt know I did receive her ultrasound report today.  Other than a small 2.5 cm simple cyst, ultrasound is normal      Irregular bleeding could have been from thyroid or viral illness in January    How has her bleeding been since starting provera.

## 2024-04-04 NOTE — TELEPHONE ENCOUNTER
I was able to review patients labs that she sent via [employer]. Please let her know we have not received the ultrasound report. If she has a copy, please forward.      Ferritin still slightly low. I see iron listed on her med list. How much and how often is she taking?    Also T4 slightly low, may need dose adjustment of her synthroid. Does her PCP manage this?

## 2024-04-04 NOTE — TELEPHONE ENCOUNTER
Delicia,    Below is the message patient sent:    Ok. Thank you, that’s good news. Cyst on ovaries or uterus? Normal hormonal cyst? Bleeding is better yet im having to take 15mg to keep bleeding stopped and I only have 10mg pills so I’m running out. Is there anyway to call me on 15-20mg? And also how long should I stay on it and then try and get off?   Thank you guys for all your communication and help!!!!

## 2024-04-08 NOTE — TELEPHONE ENCOUNTER
Patient had not read the My Chart message, so I called her.     Patient states she was only taking 1.5 tablets each day. Patient advised of the directions on the medication. Patient states she will start taking 2 tablets daily and let us know how that works for her. Patient voiced understanding. dre

## (undated) DEVICE — PENCIL ES L3M BTTN SWCH S STL HEX LOK BLDE ELECTRD HOLSTER

## (undated) DEVICE — AMD ANTIMICROBIAL NON-ADHERENT PAD,0.2% POLYHEXAMETHYLENE BIGUANIDE HCI (PHMB): Brand: TELFA

## (undated) DEVICE — SUTURE VCRL SZ 0 L36IN ABSRB UD L36MM CT-1 1/2 CIR J946H

## (undated) DEVICE — KENDALL SCD EXPRESS SLEEVES, KNEE LENGTH, MEDIUM: Brand: KENDALL SCD

## (undated) DEVICE — SUTURE MCRYL SZ 4-0 L27IN ABSRB UD L19MM PS-2 1/2 CIR PRIM Y426H

## (undated) DEVICE — MEDI-VAC NON-CONDUCTIVE SUCTION TUBING: Brand: CARDINAL HEALTH

## (undated) DEVICE — SOLUTION IV 1000ML 0.9% SOD CHL

## (undated) DEVICE — REM POLYHESIVE ADULT PATIENT RETURN ELECTRODE: Brand: VALLEYLAB

## (undated) DEVICE — SUTURE PDS II SZ 0 L27IN ABSRB VLT L36MM CT-1 1/2 CIR Z340H

## (undated) DEVICE — AMD ANTIMICROBIAL GAUZE SPONGES,12 PLY USP TYPE VII, 0.2% POLYHEXAMETHYLENE BIGUANIDE HCI (PHMB): Brand: CURITY

## (undated) DEVICE — SUTURE MCRYL SZ 3-0 L27IN ABSRB UD L60MM KS STR REV CUT Y523H

## (undated) DEVICE — SURGICAL PROCEDURE PACK C SECT CDS

## (undated) DEVICE — 3M™ STERI-STRIP™ REINFORCED ADHESIVE SKIN CLOSURES, R1547, 1/2 IN X 4 IN (12 MM X 100 MM), 6 STRIPS/ENVELOPE: Brand: 3M™ STERI-STRIP™

## (undated) DEVICE — PREMIUM WET SKIN PREP TRAY: Brand: MEDLINE INDUSTRIES, INC.

## (undated) DEVICE — (D)STRIP SKN CLSR 0.5X4IN WHT --

## (undated) DEVICE — TRAY CATH 16FR PVC INTMIT STR TIP PREATTACH TO 1000ML COLL

## (undated) DEVICE — Device: Brand: PORTEX

## (undated) DEVICE — SOLUTION IRRIG 1000ML H2O STRL BLT

## (undated) DEVICE — BARRIER ADHESION INTERCEED ABSORBABLE 3INW X 4INL

## (undated) DEVICE — SUTURE VCRL RAPIDE SZ 3-0 L36IN ABSRB UD L36MM CT-1 1/2 CIR VR944

## (undated) DEVICE — STERILE POLYISOPRENE POWDER-FREE SURGICAL GLOVES: Brand: PROTEXIS

## (undated) DEVICE — KIWI® VACUUM DELIVERY SYSTEM, OMNICUP®: Brand: KIWI® OMNICUP®